# Patient Record
Sex: FEMALE | Race: WHITE | Employment: OTHER | URBAN - METROPOLITAN AREA
[De-identification: names, ages, dates, MRNs, and addresses within clinical notes are randomized per-mention and may not be internally consistent; named-entity substitution may affect disease eponyms.]

---

## 2022-10-25 ENCOUNTER — HOSPITAL ENCOUNTER (OUTPATIENT)
Dept: PREADMISSION TESTING | Age: 72
Discharge: HOME | End: 2022-10-25
Payer: MEDICARE

## 2022-10-25 ENCOUNTER — TRANSCRIBE ORDER (OUTPATIENT)
Dept: REGISTRATION | Age: 72
End: 2022-10-25

## 2022-10-25 DIAGNOSIS — M17.11 OSTEOARTHRITIS OF RIGHT KNEE: ICD-10-CM

## 2022-10-25 DIAGNOSIS — M17.11 OSTEOARTHRITIS OF RIGHT KNEE: Primary | ICD-10-CM

## 2022-10-25 LAB
ALBUMIN SERPL-MCNC: 3.8 G/DL (ref 3.4–5)
ALBUMIN/GLOB SERPL: 1 {RATIO} (ref 0.8–1.7)
ALP SERPL-CCNC: 73 U/L (ref 45–117)
ALT SERPL-CCNC: 45 U/L (ref 13–56)
ANION GAP SERPL CALC-SCNC: 5 MMOL/L (ref 3–18)
APPEARANCE UR: CLEAR
APTT PPP: 25.5 SEC (ref 23–36.4)
AST SERPL-CCNC: 23 U/L (ref 10–38)
BACTERIA URNS QL MICRO: ABNORMAL /HPF
BASOPHILS # BLD: 0 K/UL (ref 0–0.1)
BASOPHILS NFR BLD: 1 % (ref 0–2)
BILIRUB SERPL-MCNC: 0.4 MG/DL (ref 0.2–1)
BILIRUB UR QL: NEGATIVE
BUN SERPL-MCNC: 23 MG/DL (ref 7–18)
BUN/CREAT SERPL: 27 (ref 12–20)
CALCIUM SERPL-MCNC: 10.1 MG/DL (ref 8.5–10.1)
CHLORIDE SERPL-SCNC: 104 MMOL/L (ref 100–111)
CO2 SERPL-SCNC: 30 MMOL/L (ref 21–32)
COLOR UR: YELLOW
CREAT SERPL-MCNC: 0.86 MG/DL (ref 0.6–1.3)
DIFFERENTIAL METHOD BLD: ABNORMAL
EOSINOPHIL # BLD: 0 K/UL (ref 0–0.4)
EOSINOPHIL NFR BLD: 1 % (ref 0–5)
EPITH CASTS URNS QL MICRO: ABNORMAL /LPF (ref 0–5)
ERYTHROCYTE [DISTWIDTH] IN BLOOD BY AUTOMATED COUNT: 14 % (ref 11.6–14.5)
ERYTHROCYTE [SEDIMENTATION RATE] IN BLOOD: 21 MM/HR (ref 0–30)
EST. AVERAGE GLUCOSE BLD GHB EST-MCNC: 120 MG/DL
GLOBULIN SER CALC-MCNC: 3.8 G/DL (ref 2–4)
GLUCOSE SERPL-MCNC: 71 MG/DL (ref 74–99)
GLUCOSE UR STRIP.AUTO-MCNC: NEGATIVE MG/DL
HBA1C MFR BLD: 5.8 % (ref 4.2–5.6)
HCT VFR BLD AUTO: 38.9 % (ref 35–45)
HGB BLD-MCNC: 12.7 G/DL (ref 12–16)
HGB UR QL STRIP: NEGATIVE
IMM GRANULOCYTES # BLD AUTO: 0 K/UL (ref 0–0.04)
IMM GRANULOCYTES NFR BLD AUTO: 0 % (ref 0–0.5)
INR PPP: 1 (ref 0.8–1.2)
KETONES UR QL STRIP.AUTO: NEGATIVE MG/DL
LEUKOCYTE ESTERASE UR QL STRIP.AUTO: ABNORMAL
LYMPHOCYTES # BLD: 1.9 K/UL (ref 0.9–3.6)
LYMPHOCYTES NFR BLD: 47 % (ref 21–52)
MCH RBC QN AUTO: 31.6 PG (ref 24–34)
MCHC RBC AUTO-ENTMCNC: 32.6 G/DL (ref 31–37)
MCV RBC AUTO: 96.8 FL (ref 78–100)
MONOCYTES # BLD: 0.6 K/UL (ref 0.05–1.2)
MONOCYTES NFR BLD: 14 % (ref 3–10)
NEUTS SEG # BLD: 1.6 K/UL (ref 1.8–8)
NEUTS SEG NFR BLD: 38 % (ref 40–73)
NITRITE UR QL STRIP.AUTO: NEGATIVE
NRBC # BLD: 0 K/UL (ref 0–0.01)
NRBC BLD-RTO: 0 PER 100 WBC
PH UR STRIP: 5.5 [PH] (ref 5–8)
PLATELET # BLD AUTO: 259 K/UL (ref 135–420)
PMV BLD AUTO: 11 FL (ref 9.2–11.8)
POTASSIUM SERPL-SCNC: 4.3 MMOL/L (ref 3.5–5.5)
PROT SERPL-MCNC: 7.6 G/DL (ref 6.4–8.2)
PROT UR STRIP-MCNC: NEGATIVE MG/DL
PROTHROMBIN TIME: 13.5 SEC (ref 11.5–15.2)
RBC # BLD AUTO: 4.02 M/UL (ref 4.2–5.3)
RBC #/AREA URNS HPF: NEGATIVE /HPF (ref 0–5)
SODIUM SERPL-SCNC: 139 MMOL/L (ref 136–145)
SP GR UR REFRACTOMETRY: 1.01 (ref 1–1.03)
UROBILINOGEN UR QL STRIP.AUTO: 0.2 EU/DL (ref 0.2–1)
WBC # BLD AUTO: 4.1 K/UL (ref 4.6–13.2)
WBC URNS QL MICRO: ABNORMAL /HPF (ref 0–5)

## 2022-10-25 PROCEDURE — 81001 URINALYSIS AUTO W/SCOPE: CPT

## 2022-10-25 PROCEDURE — 80053 COMPREHEN METABOLIC PANEL: CPT

## 2022-10-25 PROCEDURE — 85025 COMPLETE CBC W/AUTO DIFF WBC: CPT

## 2022-10-25 PROCEDURE — 85652 RBC SED RATE AUTOMATED: CPT

## 2022-10-25 PROCEDURE — 83036 HEMOGLOBIN GLYCOSYLATED A1C: CPT

## 2022-10-25 PROCEDURE — 93005 ELECTROCARDIOGRAM TRACING: CPT

## 2022-10-25 PROCEDURE — 85730 THROMBOPLASTIN TIME PARTIAL: CPT

## 2022-10-25 PROCEDURE — 85610 PROTHROMBIN TIME: CPT

## 2022-10-25 PROCEDURE — 36415 COLL VENOUS BLD VENIPUNCTURE: CPT

## 2022-10-26 LAB
ATRIAL RATE: 67 BPM
CALCULATED P AXIS, ECG09: 35 DEGREES
CALCULATED R AXIS, ECG10: 63 DEGREES
CALCULATED T AXIS, ECG11: 69 DEGREES
DIAGNOSIS, 93000: NORMAL
P-R INTERVAL, ECG05: 130 MS
Q-T INTERVAL, ECG07: 390 MS
QRS DURATION, ECG06: 76 MS
QTC CALCULATION (BEZET), ECG08: 412 MS
VENTRICULAR RATE, ECG03: 67 BPM

## 2022-10-27 LAB
BACTERIA SPEC CULT: NORMAL
BACTERIA SPEC CULT: NORMAL
SERVICE CMNT-IMP: NORMAL

## 2022-11-01 ENCOUNTER — HOSPITAL ENCOUNTER (OUTPATIENT)
Dept: PREADMISSION TESTING | Age: 72
Discharge: HOME | End: 2022-11-01

## 2022-11-01 VITALS — BODY MASS INDEX: 34.8 KG/M2 | HEIGHT: 69 IN | WEIGHT: 235 LBS

## 2022-11-01 RX ORDER — BUPROPION HYDROCHLORIDE 150 MG/1
150 TABLET ORAL DAILY
COMMUNITY

## 2022-11-01 RX ORDER — MELATONIN
1000 DAILY
COMMUNITY

## 2022-11-01 RX ORDER — METFORMIN HYDROCHLORIDE 500 MG/1
500 TABLET, EXTENDED RELEASE ORAL EVERY MORNING
COMMUNITY
Start: 2022-07-13

## 2022-11-01 RX ORDER — RAMIPRIL 5 MG/1
CAPSULE ORAL
COMMUNITY
End: 2022-11-01

## 2022-11-01 RX ORDER — LOSARTAN POTASSIUM 100 MG/1
100 TABLET ORAL DAILY
COMMUNITY
End: 2022-11-01

## 2022-11-01 RX ORDER — BUPROPION HYDROCHLORIDE 300 MG/1
TABLET ORAL
COMMUNITY
End: 2022-11-01

## 2022-11-01 RX ORDER — THERA TABS 400 MCG
1 TAB ORAL DAILY
COMMUNITY

## 2022-11-01 RX ORDER — OLMESARTAN MEDOXOMIL 40 MG/1
40 TABLET ORAL DAILY
COMMUNITY

## 2022-11-01 RX ORDER — HYDROCHLOROTHIAZIDE 25 MG/1
25 TABLET ORAL DAILY
COMMUNITY
Start: 2022-09-20

## 2022-11-01 RX ORDER — OXYCODONE HYDROCHLORIDE 5 MG/1
5 TABLET ORAL
COMMUNITY
Start: 2022-09-01 | End: 2022-11-01

## 2022-11-01 RX ORDER — CEFAZOLIN SODIUM/WATER 2 G/20 ML
2 SYRINGE (ML) INTRAVENOUS ONCE
Status: CANCELLED | OUTPATIENT
Start: 2022-11-01 | End: 2022-11-01

## 2022-11-01 RX ORDER — SODIUM CHLORIDE, SODIUM LACTATE, POTASSIUM CHLORIDE, CALCIUM CHLORIDE 600; 310; 30; 20 MG/100ML; MG/100ML; MG/100ML; MG/100ML
125 INJECTION, SOLUTION INTRAVENOUS CONTINUOUS
Status: CANCELLED | OUTPATIENT
Start: 2022-11-01

## 2022-11-01 NOTE — PERIOP NOTES
PAT - SURGICAL PRE-ADMISSION INSTRUCTIONS    NAME:  Kate Pratt DATE:  11/1/2022    SURGERY DATE:  11/21/2022                                  SURGERY ARRIVAL TIME:   TBA    Do NOT eat or drink anything, including candy or gum, after MIDNIGHT on 11/21/2022 , unless you have specific instructions from your Surgeon or Anesthesia Provider to do so. No smoking 24 hours before surgery. No alcohol 24 hours prior to the day of surgery. No recreational drugs for one week prior to the day of surgery. Leave all valuables, including money/purse, at home. Remove all jewelry, nail polish, makeup (including mascara); no lotions, powders, deodorant, or perfume/cologne/after shave. Glasses/Contact lenses and Dentures may be worn to the hospital.  They will be removed prior to surgery. Call your doctor if symptoms of a cold or illness develop within 24 ours prior to surgery. AN ADULT MUST DRIVE YOU HOME AFTER OUTPATIENT SURGERY. If you are having an OUTPATIENT procedure, please make arrangements for a responsible adult to be with you for 24 hours after your surgery. If you are admitted to the hospital, you will be assigned to a bed after surgery is complete. Normally a family member will not be able to see you until you are in your assigned bed. 12. Visitation Restrictions Explained. Special Instructions:  Covid Test not needed , Quarantine requirements discussed  Patient has Advanced Directive not sure on DNR will bring copy to be scanned into record day of surgery  Take these medications the morning of surgery with a sip of water:  bupropion , Bring any pertinent legal medical records., Bring durable medical equipment (DME) needed:  wa;ler, HOLD oral diabetic medication on the MORNING OF surgery.     Patient Prep:    use CHG solution three nights prior to procedure     These surgical instructions were reviewed with patient during the PAT phone call

## 2022-11-20 PROBLEM — M17.11 OSTEOARTHRITIS OF RIGHT KNEE: Chronic | Status: ACTIVE | Noted: 2022-11-20

## 2022-11-20 PROBLEM — M17.11 OSTEOARTHRITIS OF RIGHT KNEE: Status: ACTIVE | Noted: 2022-11-20

## 2022-11-20 RX ORDER — SODIUM CHLORIDE 9 MG/ML
125 INJECTION, SOLUTION INTRAVENOUS CONTINUOUS
Status: CANCELLED | OUTPATIENT
Start: 2022-11-20 | End: 2022-11-21

## 2022-11-20 RX ORDER — CEFAZOLIN SODIUM/WATER 2 G/20 ML
2 SYRINGE (ML) INTRAVENOUS EVERY 8 HOURS
Status: CANCELLED | OUTPATIENT
Start: 2022-11-20 | End: 2022-11-21

## 2022-11-20 RX ORDER — OXYCODONE HYDROCHLORIDE 5 MG/1
10 TABLET ORAL
Status: CANCELLED | OUTPATIENT
Start: 2022-11-20

## 2022-11-20 RX ORDER — SODIUM CHLORIDE 0.9 % (FLUSH) 0.9 %
5-40 SYRINGE (ML) INJECTION EVERY 8 HOURS
Status: CANCELLED | OUTPATIENT
Start: 2022-11-20

## 2022-11-20 RX ORDER — SODIUM CHLORIDE 0.9 % (FLUSH) 0.9 %
5-40 SYRINGE (ML) INJECTION AS NEEDED
Status: CANCELLED | OUTPATIENT
Start: 2022-11-20

## 2022-11-20 RX ORDER — SODIUM CHLORIDE 9 MG/ML
300 INJECTION, SOLUTION INTRAVENOUS CONTINUOUS
Status: CANCELLED | OUTPATIENT
Start: 2022-11-20 | End: 2022-11-20

## 2022-11-20 RX ORDER — DOCUSATE SODIUM 100 MG/1
100 CAPSULE, LIQUID FILLED ORAL DAILY
Status: CANCELLED | OUTPATIENT
Start: 2022-11-21

## 2022-11-20 RX ORDER — LANOLIN ALCOHOL/MO/W.PET/CERES
1 CREAM (GRAM) TOPICAL 3 TIMES DAILY
Status: CANCELLED | OUTPATIENT
Start: 2022-11-20

## 2022-11-20 RX ORDER — ACETAMINOPHEN 325 MG/1
650 TABLET ORAL EVERY 6 HOURS
Status: CANCELLED | OUTPATIENT
Start: 2022-11-20

## 2022-11-20 RX ORDER — DIPHENHYDRAMINE HYDROCHLORIDE 50 MG/ML
12.5 INJECTION, SOLUTION INTRAMUSCULAR; INTRAVENOUS
Status: CANCELLED | OUTPATIENT
Start: 2022-11-20

## 2022-11-20 RX ORDER — NALOXONE HYDROCHLORIDE 0.4 MG/ML
0.4 INJECTION, SOLUTION INTRAMUSCULAR; INTRAVENOUS; SUBCUTANEOUS AS NEEDED
Status: CANCELLED | OUTPATIENT
Start: 2022-11-20

## 2022-11-20 RX ORDER — ASPIRIN 81 MG/1
81 TABLET ORAL 2 TIMES DAILY
Status: CANCELLED | OUTPATIENT
Start: 2022-11-20

## 2022-11-20 NOTE — H&P
[address] [facility] [address] Medicine  History and Physical Exam    Patient: Elizabeth Waldron MRN: [id number]  SSN: [id number]    YOB: 1950  Age: 81 y.o. Sex: female      Subjective:      Chief Complaint: Right knee pain    History of Present Illness:  Patient complains of pain to the right knee and difficulty ambulating, which has progressively worsened over several months. X-rays showed osteoarthritis of the joint. The patient's pain has persisted and progressed despite conservative treatments and therapies. The patient has been previously treated with medications and/or injections. The patient has at this time opted for surgical intervention. 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 in normal range    Diabetes ([facility] [address]     Hypertension     Osteoarthritis of right knee 11/20/2022    Psychiatric disorder      Past Surgical History:   Procedure Laterality Date    HX APPENDECTOMY  2022    HX HEENT      dental implant    HX ORTHOPAEDIC  2004    bilaterl menicus repair    HX OTHER SURGICAL      colonoscopy x 3    [place] ABDOMEN SURGERY PROC UNLISTED  2022    tumor removed from colon     Social History     Occupational History    Not on file   Tobacco Use    Smoking status: Never    Smokeless tobacco: Never   Vaping Use    Vaping Use: Never used   Substance and Sexual Activity    Alcohol use: Yes     Comment: socially    Drug use: Not Currently     Comment: in 70's not currently    Sexual activity: Not on file     Prior to Admission medications    Medication Sig Start Date End Date Taking? Authorizing Provider   buPROPion XL (WELLBUTRIN XL) 150 mg tablet Take 150 mg by mouth daily. Provider, Historical   cholecalciferol (VITAMIN D3) (1000 Units /25 mcg) tablet Take 1,000 Units by mouth daily.     Provider, Historical hydroCHLOROthiazide (HYDRODIURIL) 25 mg tablet Take 25 mg by mouth daily. 9/20/22   Provider, Historical   metFORMIN ER (GLUCOPHAGE XR) 500 mg tablet Take 500 mg by mouth Every morning. 7/13/22   Provider, Historical   therapeutic multivitamin (THERAGRAN) tablet Take 1 Tablet by mouth daily. Provider, Historical   olmesartan (BENICAR) 40 mg tablet Take 40 mg by mouth daily. Provider, Historical   OTHER Take 1 Tablet by mouth daily. Zinc    Provider, Historical       Allergies: Allergies   Allergen Reactions    Ace Inhibitors Cough        Review of Systems:  A comprehensive review of systems was negative except for that written in the History of Present Illness. Objective:       Physical Exam:  HEENT: Normocephalic, atraumatic  Lungs:  Clear to auscultation  Heart:   Regular rate and rhythm  Abdomen: Soft  Extremities:  Pain with range of motion of the right knee. Active ROM limited due to pain. Tenderness generalized. Crepitus present. Antalgic gait. Assessment:      Arthritis of the right knee. Plan:       Proceed with scheduled RIGHT TOTAL KNEE ARTHROPLASTY. The various methods of treatment have been discussed with the patient and family. After consideration of risks, benefits, and other options for treatment, the patient has consented to surgical interventions. Questions were answered and preoperative teaching was done by Dr Cady Hollis.      Signed By: ERICA Feng     November 20, 2022

## 2022-11-21 ENCOUNTER — HOSPITAL ENCOUNTER (OUTPATIENT)
Age: 72
Discharge: HOME HEALTH | End: 2022-11-21
Attending: ORTHOPAEDIC SURGERY | Admitting: ORTHOPAEDIC SURGERY
Payer: MEDICARE

## 2022-11-21 ENCOUNTER — HOME HEALTH ADMISSION (OUTPATIENT)
Dept: HOME HEALTH SERVICES | Facility: HOME HEALTH | Age: 72
End: 2022-11-21
Payer: MEDICARE

## 2022-11-21 ENCOUNTER — ANESTHESIA EVENT (OUTPATIENT)
Dept: SURGERY | Age: 72
End: 2022-11-21
Payer: MEDICARE

## 2022-11-21 ENCOUNTER — ANESTHESIA (OUTPATIENT)
Dept: SURGERY | Age: 72
End: 2022-11-21
Payer: MEDICARE

## 2022-11-21 ENCOUNTER — APPOINTMENT (OUTPATIENT)
Dept: GENERAL RADIOLOGY | Age: 72
End: 2022-11-21
Attending: PHYSICIAN ASSISTANT
Payer: MEDICARE

## 2022-11-21 VITALS
RESPIRATION RATE: 14 BRPM | HEART RATE: 74 BPM | SYSTOLIC BLOOD PRESSURE: 140 MMHG | WEIGHT: 231.9 LBS | DIASTOLIC BLOOD PRESSURE: 67 MMHG | OXYGEN SATURATION: 95 % | HEIGHT: 69 IN | TEMPERATURE: 98.4 F | BODY MASS INDEX: 34.35 KG/M2

## 2022-11-21 DIAGNOSIS — M17.11 PRIMARY OSTEOARTHRITIS OF RIGHT KNEE: Primary | Chronic | ICD-10-CM

## 2022-11-21 LAB
ABO + RH BLD: NORMAL
BLOOD GROUP ANTIBODIES SERPL: NORMAL
GLUCOSE BLD STRIP.AUTO-MCNC: 101 MG/DL (ref 70–110)
GLUCOSE BLD STRIP.AUTO-MCNC: 123 MG/DL (ref 70–110)
SPECIMEN EXP DATE BLD: NORMAL

## 2022-11-21 PROCEDURE — 74011250636 HC RX REV CODE- 250/636: Performed by: ORTHOPAEDIC SURGERY

## 2022-11-21 PROCEDURE — 77030027138 HC INCENT SPIROMETER -A: Performed by: ORTHOPAEDIC SURGERY

## 2022-11-21 PROCEDURE — 73560 X-RAY EXAM OF KNEE 1 OR 2: CPT

## 2022-11-21 PROCEDURE — 77030039760: Performed by: ORTHOPAEDIC SURGERY

## 2022-11-21 PROCEDURE — 74011250636 HC RX REV CODE- 250/636: Performed by: PHYSICIAN ASSISTANT

## 2022-11-21 PROCEDURE — 74011000250 HC RX REV CODE- 250

## 2022-11-21 PROCEDURE — 74011250636 HC RX REV CODE- 250/636

## 2022-11-21 PROCEDURE — 74011250636 HC RX REV CODE- 250/636: Performed by: ANESTHESIOLOGY

## 2022-11-21 PROCEDURE — 77030013708 HC HNDPC SUC IRR PULS STRY –B: Performed by: ORTHOPAEDIC SURGERY

## 2022-11-21 PROCEDURE — 74011000250 HC RX REV CODE- 250: Performed by: ANESTHESIOLOGY

## 2022-11-21 PROCEDURE — 97161 PT EVAL LOW COMPLEX 20 MIN: CPT

## 2022-11-21 PROCEDURE — 77030016060 HC NDL NRV BLK TELE -A: Performed by: ANESTHESIOLOGY

## 2022-11-21 PROCEDURE — 64447 NJX AA&/STRD FEMORAL NRV IMG: CPT | Performed by: ANESTHESIOLOGY

## 2022-11-21 PROCEDURE — 76942 ECHO GUIDE FOR BIOPSY: CPT | Performed by: ORTHOPAEDIC SURGERY

## 2022-11-21 PROCEDURE — 74011250637 HC RX REV CODE- 250/637: Performed by: ORTHOPAEDIC SURGERY

## 2022-11-21 PROCEDURE — 82962 GLUCOSE BLOOD TEST: CPT

## 2022-11-21 PROCEDURE — 74011000250 HC RX REV CODE- 250: Performed by: ORTHOPAEDIC SURGERY

## 2022-11-21 PROCEDURE — 77030002934 HC SUT MCRYL J&J -B: Performed by: ORTHOPAEDIC SURGERY

## 2022-11-21 PROCEDURE — 36415 COLL VENOUS BLD VENIPUNCTURE: CPT

## 2022-11-21 PROCEDURE — 74011250637 HC RX REV CODE- 250/637: Performed by: PHYSICIAN ASSISTANT

## 2022-11-21 PROCEDURE — 76210000006 HC OR PH I REC 0.5 TO 1 HR: Performed by: ORTHOPAEDIC SURGERY

## 2022-11-21 PROCEDURE — 76060000033 HC ANESTHESIA 1 TO 1.5 HR: Performed by: ORTHOPAEDIC SURGERY

## 2022-11-21 PROCEDURE — 76010000149 HC OR TIME 1 TO 1.5 HR: Performed by: ORTHOPAEDIC SURGERY

## 2022-11-21 PROCEDURE — 2709999900 HC NON-CHARGEABLE SUPPLY: Performed by: ORTHOPAEDIC SURGERY

## 2022-11-21 PROCEDURE — 74011000258 HC RX REV CODE- 258: Performed by: ORTHOPAEDIC SURGERY

## 2022-11-21 PROCEDURE — 77030031139 HC SUT VCRL2 J&J -A: Performed by: ORTHOPAEDIC SURGERY

## 2022-11-21 PROCEDURE — 97535 SELF CARE MNGMENT TRAINING: CPT

## 2022-11-21 PROCEDURE — 77030037713 HC CLOSR DEV INCIS ZIP STRY -B: Performed by: ORTHOPAEDIC SURGERY

## 2022-11-21 PROCEDURE — C1776 JOINT DEVICE (IMPLANTABLE): HCPCS | Performed by: ORTHOPAEDIC SURGERY

## 2022-11-21 PROCEDURE — 86900 BLOOD TYPING SEROLOGIC ABO: CPT

## 2022-11-21 PROCEDURE — 77030014144 HC TY SPN ANES BBMI -B: Performed by: ANESTHESIOLOGY

## 2022-11-21 PROCEDURE — 77030012893: Performed by: ORTHOPAEDIC SURGERY

## 2022-11-21 PROCEDURE — 77030034694 HC SCPL CANADY PLSM DISP USMD -E: Performed by: ORTHOPAEDIC SURGERY

## 2022-11-21 PROCEDURE — 76210000022 HC REC RM PH II 1.5 TO 2 HR: Performed by: ORTHOPAEDIC SURGERY

## 2022-11-21 PROCEDURE — 97165 OT EVAL LOW COMPLEX 30 MIN: CPT

## 2022-11-21 PROCEDURE — 74011250637 HC RX REV CODE- 250/637: Performed by: ANESTHESIOLOGY

## 2022-11-21 PROCEDURE — 77030033263 HC DRSG MEPILEX 16-48IN BORD MOLN -B: Performed by: ORTHOPAEDIC SURGERY

## 2022-11-21 PROCEDURE — 77030011628: Performed by: ORTHOPAEDIC SURGERY

## 2022-11-21 PROCEDURE — C1713 ANCHOR/SCREW BN/BN,TIS/BN: HCPCS | Performed by: ORTHOPAEDIC SURGERY

## 2022-11-21 PROCEDURE — 77030020782 HC GWN BAIR PAWS FLX 3M -B: Performed by: ORTHOPAEDIC SURGERY

## 2022-11-21 PROCEDURE — 77030038010: Performed by: ORTHOPAEDIC SURGERY

## 2022-11-21 PROCEDURE — 97116 GAIT TRAINING THERAPY: CPT

## 2022-11-21 PROCEDURE — 77030003666 HC NDL SPINAL BD -A: Performed by: ORTHOPAEDIC SURGERY

## 2022-11-21 DEVICE — CEMENT BNE 40GM FULL DOSE PMMA W/O ANTIBIO HI VISC N RADPQ: Type: IMPLANTABLE DEVICE | Site: KNEE | Status: FUNCTIONAL

## 2022-11-21 DEVICE — SIZE 4 TIBIAL TRAY NONPOROUS
Type: IMPLANTABLE DEVICE | Site: KNEE | Status: FUNCTIONAL
Brand: BALANCED KNEE SYSTEM

## 2022-11-21 DEVICE — KNEE K1 TOT HEMI STD CEM IMPL CAPPED K1 OD: Type: IMPLANTABLE DEVICE | Site: KNEE | Status: FUNCTIONAL

## 2022-11-21 DEVICE — RT SIZE 4 FEMORAL CR NONPOROUS
Type: IMPLANTABLE DEVICE | Site: KNEE | Status: FUNCTIONAL
Brand: BKS TRIMAX

## 2022-11-21 DEVICE — SIZE 4 8MM TIBIAL INSERT CR
Type: IMPLANTABLE DEVICE | Site: KNEE | Status: FUNCTIONAL
Brand: BKS E-VITALIZE

## 2022-11-21 RX ORDER — ROPIVACAINE HYDROCHLORIDE 5 MG/ML
INJECTION, SOLUTION EPIDURAL; INFILTRATION; PERINEURAL AS NEEDED
Status: DISCONTINUED | OUTPATIENT
Start: 2022-11-21 | End: 2022-11-21 | Stop reason: HOSPADM

## 2022-11-21 RX ORDER — PREGABALIN 25 MG/1
25 CAPSULE ORAL
Status: COMPLETED | OUTPATIENT
Start: 2022-11-21 | End: 2022-11-21

## 2022-11-21 RX ORDER — PANTOPRAZOLE SODIUM 40 MG/1
40 TABLET, DELAYED RELEASE ORAL DAILY
Status: DISCONTINUED | OUTPATIENT
Start: 2022-11-21 | End: 2022-11-21 | Stop reason: HOSPADM

## 2022-11-21 RX ORDER — PROPOFOL 10 MG/ML
INJECTION, EMULSION INTRAVENOUS
Status: DISCONTINUED | OUTPATIENT
Start: 2022-11-21 | End: 2022-11-21 | Stop reason: HOSPADM

## 2022-11-21 RX ORDER — CEFADROXIL 500 MG/1
500 CAPSULE ORAL 2 TIMES DAILY
Qty: 10 CAPSULE | Refills: 0 | Status: SHIPPED | OUTPATIENT
Start: 2022-11-21 | End: 2022-11-26

## 2022-11-21 RX ORDER — PANTOPRAZOLE SODIUM 40 MG/1
40 TABLET, DELAYED RELEASE ORAL DAILY
Status: DISCONTINUED | OUTPATIENT
Start: 2022-11-21 | End: 2022-11-21

## 2022-11-21 RX ORDER — ONDANSETRON 2 MG/ML
4 INJECTION INTRAMUSCULAR; INTRAVENOUS ONCE
Status: DISCONTINUED | OUTPATIENT
Start: 2022-11-21 | End: 2022-11-21 | Stop reason: HOSPADM

## 2022-11-21 RX ORDER — DEXAMETHASONE SODIUM PHOSPHATE 4 MG/ML
4 INJECTION, SOLUTION INTRA-ARTICULAR; INTRALESIONAL; INTRAMUSCULAR; INTRAVENOUS; SOFT TISSUE ONCE
Status: COMPLETED | OUTPATIENT
Start: 2022-11-21 | End: 2022-11-21

## 2022-11-21 RX ORDER — SODIUM CHLORIDE, SODIUM LACTATE, POTASSIUM CHLORIDE, CALCIUM CHLORIDE 600; 310; 30; 20 MG/100ML; MG/100ML; MG/100ML; MG/100ML
125 INJECTION, SOLUTION INTRAVENOUS CONTINUOUS
Status: DISCONTINUED | OUTPATIENT
Start: 2022-11-21 | End: 2022-11-21 | Stop reason: HOSPADM

## 2022-11-21 RX ORDER — FENTANYL CITRATE 50 UG/ML
INJECTION, SOLUTION INTRAMUSCULAR; INTRAVENOUS AS NEEDED
Status: DISCONTINUED | OUTPATIENT
Start: 2022-11-21 | End: 2022-11-21 | Stop reason: HOSPADM

## 2022-11-21 RX ORDER — FENTANYL CITRATE 50 UG/ML
50 INJECTION, SOLUTION INTRAMUSCULAR; INTRAVENOUS
Status: DISCONTINUED | OUTPATIENT
Start: 2022-11-21 | End: 2022-11-21 | Stop reason: HOSPADM

## 2022-11-21 RX ORDER — OXYCODONE AND ACETAMINOPHEN 5; 325 MG/1; MG/1
1 TABLET ORAL
Status: DISCONTINUED | OUTPATIENT
Start: 2022-11-21 | End: 2022-11-21 | Stop reason: HOSPADM

## 2022-11-21 RX ORDER — DEXMEDETOMIDINE HYDROCHLORIDE 100 UG/ML
INJECTION, SOLUTION INTRAVENOUS AS NEEDED
Status: DISCONTINUED | OUTPATIENT
Start: 2022-11-21 | End: 2022-11-21 | Stop reason: HOSPADM

## 2022-11-21 RX ORDER — ONDANSETRON 2 MG/ML
4 INJECTION INTRAMUSCULAR; INTRAVENOUS
Status: DISCONTINUED | OUTPATIENT
Start: 2022-11-21 | End: 2022-11-21 | Stop reason: HOSPADM

## 2022-11-21 RX ORDER — PROPOFOL 10 MG/ML
INJECTION, EMULSION INTRAVENOUS AS NEEDED
Status: DISCONTINUED | OUTPATIENT
Start: 2022-11-21 | End: 2022-11-21 | Stop reason: HOSPADM

## 2022-11-21 RX ORDER — ACETAMINOPHEN 500 MG
1000 TABLET ORAL ONCE
Status: COMPLETED | OUTPATIENT
Start: 2022-11-21 | End: 2022-11-21

## 2022-11-21 RX ORDER — DEXAMETHASONE SODIUM PHOSPHATE 4 MG/ML
8 INJECTION, SOLUTION INTRA-ARTICULAR; INTRALESIONAL; INTRAMUSCULAR; INTRAVENOUS; SOFT TISSUE ONCE
Status: COMPLETED | OUTPATIENT
Start: 2022-11-21 | End: 2022-11-21

## 2022-11-21 RX ORDER — HYDROMORPHONE HYDROCHLORIDE 1 MG/ML
0.5 INJECTION, SOLUTION INTRAMUSCULAR; INTRAVENOUS; SUBCUTANEOUS
Status: DISCONTINUED | OUTPATIENT
Start: 2022-11-21 | End: 2022-11-21 | Stop reason: HOSPADM

## 2022-11-21 RX ORDER — TRANEXAMIC ACID 650 MG/1
1950 TABLET ORAL ONCE
Status: COMPLETED | OUTPATIENT
Start: 2022-11-21 | End: 2022-11-21

## 2022-11-21 RX ORDER — ASPIRIN 81 MG/1
81 TABLET ORAL 2 TIMES DAILY
Qty: 42 TABLET | Refills: 0 | Status: SHIPPED | OUTPATIENT
Start: 2022-11-21 | End: 2022-12-12

## 2022-11-21 RX ORDER — CEFAZOLIN SODIUM/WATER 2 G/20 ML
2 SYRINGE (ML) INTRAVENOUS ONCE
Status: COMPLETED | OUTPATIENT
Start: 2022-11-21 | End: 2022-11-21

## 2022-11-21 RX ORDER — DEXAMETHASONE SODIUM PHOSPHATE 100 MG/10ML
INJECTION INTRAMUSCULAR; INTRAVENOUS AS NEEDED
Status: DISCONTINUED | OUTPATIENT
Start: 2022-11-21 | End: 2022-11-21 | Stop reason: HOSPADM

## 2022-11-21 RX ORDER — OXYCODONE HYDROCHLORIDE 5 MG/1
5 TABLET ORAL
Status: DISCONTINUED | OUTPATIENT
Start: 2022-11-21 | End: 2022-11-21 | Stop reason: HOSPADM

## 2022-11-21 RX ORDER — SODIUM CHLORIDE, SODIUM LACTATE, POTASSIUM CHLORIDE, CALCIUM CHLORIDE 600; 310; 30; 20 MG/100ML; MG/100ML; MG/100ML; MG/100ML
100 INJECTION, SOLUTION INTRAVENOUS CONTINUOUS
Status: DISCONTINUED | OUTPATIENT
Start: 2022-11-21 | End: 2022-11-21 | Stop reason: HOSPADM

## 2022-11-21 RX ORDER — OXYCODONE HYDROCHLORIDE 5 MG/1
5 TABLET ORAL
Qty: 42 TABLET | Refills: 0 | Status: SHIPPED | OUTPATIENT
Start: 2022-11-21 | End: 2022-11-28

## 2022-11-21 RX ORDER — NALOXONE HYDROCHLORIDE 0.4 MG/ML
0.4 INJECTION, SOLUTION INTRAMUSCULAR; INTRAVENOUS; SUBCUTANEOUS AS NEEDED
Status: DISCONTINUED | OUTPATIENT
Start: 2022-11-21 | End: 2022-11-21 | Stop reason: HOSPADM

## 2022-11-21 RX ORDER — CELECOXIB 100 MG/1
400 CAPSULE ORAL
Status: COMPLETED | OUTPATIENT
Start: 2022-11-21 | End: 2022-11-21

## 2022-11-21 RX ORDER — FLUMAZENIL 0.1 MG/ML
0.2 INJECTION INTRAVENOUS
Status: DISCONTINUED | OUTPATIENT
Start: 2022-11-21 | End: 2022-11-21 | Stop reason: HOSPADM

## 2022-11-21 RX ORDER — LIDOCAINE HYDROCHLORIDE 20 MG/ML
INJECTION, SOLUTION EPIDURAL; INFILTRATION; INTRACAUDAL; PERINEURAL AS NEEDED
Status: DISCONTINUED | OUTPATIENT
Start: 2022-11-21 | End: 2022-11-21 | Stop reason: HOSPADM

## 2022-11-21 RX ORDER — DEXAMETHASONE SODIUM PHOSPHATE 4 MG/ML
8 INJECTION, SOLUTION INTRA-ARTICULAR; INTRALESIONAL; INTRAMUSCULAR; INTRAVENOUS; SOFT TISSUE ONCE
Status: DISCONTINUED | OUTPATIENT
Start: 2022-11-21 | End: 2022-11-21

## 2022-11-21 RX ADMIN — TRANEXAMIC ACID 1950 MG: 650 TABLET ORAL at 08:57

## 2022-11-21 RX ADMIN — DEXAMETHASONE SODIUM PHOSPHATE 4 MG: 10 INJECTION INTRAMUSCULAR; INTRAVENOUS at 10:31

## 2022-11-21 RX ADMIN — SODIUM CHLORIDE, SODIUM LACTATE, POTASSIUM CHLORIDE, AND CALCIUM CHLORIDE 1000 ML: 600; 310; 30; 20 INJECTION, SOLUTION INTRAVENOUS at 08:53

## 2022-11-21 RX ADMIN — FENTANYL CITRATE 100 MCG: 50 INJECTION, SOLUTION INTRAMUSCULAR; INTRAVENOUS at 10:28

## 2022-11-21 RX ADMIN — DEXAMETHASONE SODIUM PHOSPHATE 4 MG: 4 INJECTION, SOLUTION INTRAMUSCULAR; INTRAVENOUS at 08:57

## 2022-11-21 RX ADMIN — PROPOFOL 25 MG: 10 INJECTION, EMULSION INTRAVENOUS at 11:47

## 2022-11-21 RX ADMIN — PREGABALIN 25 MG: 25 CAPSULE ORAL at 08:57

## 2022-11-21 RX ADMIN — CELECOXIB 400 MG: 100 CAPSULE ORAL at 08:57

## 2022-11-21 RX ADMIN — LIDOCAINE HYDROCHLORIDE 50 MG: 20 INJECTION, SOLUTION INTRAVENOUS at 11:47

## 2022-11-21 RX ADMIN — ROPIVACAINE HYDROCHLORIDE 25 ML: 5 INJECTION, SOLUTION EPIDURAL; INFILTRATION; PERINEURAL at 10:31

## 2022-11-21 RX ADMIN — PROPOFOL 150 MCG/KG/MIN: 10 INJECTION, EMULSION INTRAVENOUS at 11:50

## 2022-11-21 RX ADMIN — DEXAMETHASONE SODIUM PHOSPHATE 8 MG: 4 INJECTION, SOLUTION INTRAMUSCULAR; INTRAVENOUS at 14:23

## 2022-11-21 RX ADMIN — DEXMEDETOMIDINE HYDROCHLORIDE 20 MCG: 100 INJECTION, SOLUTION INTRAVENOUS at 10:31

## 2022-11-21 RX ADMIN — SODIUM CHLORIDE, SODIUM LACTATE, POTASSIUM CHLORIDE, AND CALCIUM CHLORIDE 125 ML/HR: 600; 310; 30; 20 INJECTION, SOLUTION INTRAVENOUS at 08:53

## 2022-11-21 RX ADMIN — ACETAMINOPHEN 1000 MG: 500 TABLET ORAL at 08:57

## 2022-11-21 RX ADMIN — PANTOPRAZOLE SODIUM 40 MG: 40 TABLET, DELAYED RELEASE ORAL at 08:57

## 2022-11-21 RX ADMIN — Medication 2 G: at 11:32

## 2022-11-21 RX ADMIN — MEPIVACAINE HYDROCHLORIDE 45 MG: 15 INJECTION, SOLUTION EPIDURAL; INFILTRATION at 11:44

## 2022-11-21 RX ADMIN — LIDOCAINE HYDROCHLORIDE 50 MG: 20 INJECTION, SOLUTION INTRAVENOUS at 11:49

## 2022-11-21 RX ADMIN — PROPOFOL 25 MG: 10 INJECTION, EMULSION INTRAVENOUS at 11:49

## 2022-11-21 NOTE — PERIOP NOTES
TRANSFER - IN REPORT:    Verbal report received from [facility], OR nurse(name) on Audelia Leaver  being received from OR (unit) for routine progression of care      Report consisted of patients Situation, Background, Assessment and   Recommendations(SBAR). Information from the following report(s) OR Summary, Procedure Summary, Intake/Output, and MAR was reviewed with the receiving nurse. Opportunity for questions and clarification was provided. Assessment completed upon patients arrival to unit and care assumed.

## 2022-11-21 NOTE — PROGRESS NOTES
Problem: Mobility Impaired (Adult and Pediatric)  Goal: *Acute Goals and Plan of Care (Insert Text)  Description: PT goals to be met in 1 day:  Pt will be able to perform supine<>sit SBA for transfers at home. Pt will be able to perform sit<>stand SBA for increased ability to transfer at home safely. Pt will be able to participate in gt training >100' w/ RW, WBAT, GB and CGA/SBA for improved ability in home upon d/c. Pt will be able to perform stair training step to pattern, B/U rail and CGA to obtain safe entry into home upon d/c. Pt will be educated regarding HEP per MD protocol for optimal AROM/strength outcomes. Note: [x]  Patient has met MD mobilization critieria for d/c home   [x]  Recommend HH with 24 hour adult care   []  Benefit from additional acute PT session to address:      PHYSICAL THERAPY EVALUATION    Patient: Elvin Welsh (10 y.o. female)  Date: 11/21/2022  Primary Diagnosis: Osteoarthritis of right knee [M17.11]  Procedure(s) (LRB):  RIGHT TOTAL KNEE REPLACEMENT (SUBVASTUS) (Right) Day of Surgery   Precautions:   Fall, WBAT  PLOF: Independent    ASSESSMENT :  Based on the objective data described below, decreased mobility in regards to bed mobility, transfers, gt quality and tolerance, balance, stair negotiation and safety due to R TKA surgery. Decreased AROM of R knee, dec strength of R knee, pain in R knee, dec sensation of R knee also impacting pt functional mobility. Pt rating pain on numerical pain scale pre/post and during session 0/10. Pt and caregiver ed regarding mobility safety, WB, HEP, ice application/use, elevation, environmental safety and home safe techniques. Pt supine on stretcher upon arrival.  Pt able to perform supine>sit SBA and sit<>stand w/ CGA/SBA. Safety vc required throughout session to reinforce safety. Pt able to participate in gt training using RW, GB, WBAT and CGA w/ antalgic gt pattern.   Pt was able to participate in stair training using step to pattern, B UE support and CGA. Answered questions by pt and caregiver in regards to PT and mobility. Pt left sitting in recliner w/ all needs within reach and ice pack to R knee. Nurse aware of session and outcomes. Recommend HHPT with responsible adult care at least 24 hours upon hospital d/c. Patient will benefit from skilled intervention to address the above impairments. Patient's rehabilitation potential is considered to be Good  Factors which may influence rehabilitation potential include:   []         None noted  []         Mental ability/status  []         Medical condition  []         Home/family situation and support systems  []         Safety awareness  [x]         Pain tolerance/management  []         Other:      PLAN :  Recommendations and Planned Interventions:   [x]           Bed Mobility Training             []    Neuromuscular Re-Education  [x]           Transfer Training                   []    Orthotic/Prosthetic Training  [x]           Gait Training                          [x]    Modalities  [x]           Therapeutic Exercises           [x]    Edema Management/Control  [x]           Therapeutic Activities            [x]    Family Training/Education  [x]           Patient Education  []           Other (comment):    Frequency/Duration: Patient will be followed by physical therapy 1-2 times per day/4-7 days per week to address goals. Discharge Recommendations: Home Health  Further Equipment Recommendations for Discharge: N/A    AMPA: 18/24    This AMPAC score should be considered in conjunction with interdisciplinary team recommendations to determine the most appropriate discharge setting. Patient's social support, diagnosis, medical stability, and prior level of function should also be taken into consideration. SUBJECTIVE:   Patient stated I am feeling nothing right now. 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 in normal range    Diabetes ([facility]     Hypertension     Osteoarthritis of right knee 11/20/2022    Psychiatric disorder      Past Surgical History:   Procedure Laterality Date    HX APPENDECTOMY  2022    HX HEENT      dental implant    HX ORTHOPAEDIC  2004    bilaterl menicus repair    HX OTHER SURGICAL      colonoscopy x 3    [place] ABDOMEN SURGERY PROC UNLISTED  2022    tumor removed from colon     Barriers to Learning/Limitations: yes;  physical and other anesthesia  Compensate with: Visual Cues, Verbal Cues, Tactile Cues, and Kinesthetic Cues  Home Situation:  Home Situation  Home Environment: Private residence  # Steps to Enter: 2  One/Two Story Residence: Two story  # of Interior Steps: 14  Interior Rails: Right  Lift Chair Available: No  Living Alone: No  Support Systems: Spouse/Significant Other  Patient Expects to be Discharged to[de-identified] Home with home health  Current DME Used/Available at Home: Walker, rolling, Raised toilet seat  Tub or Shower Type: Shower  Critical Behavior:  Neurologic State: Alert  Orientation Level: Oriented to person;Oriented to place;Oriented to situation  Cognition: Follows commands  Safety/Judgement: Awareness of environment  Psychosocial  Patient Behaviors: Calm; Cooperative  Family  Behaviors: Supportive;Calm  Skin Condition/Temp: Warm;Dry  Family  Behaviors: Supportive;Calm  Skin Integrity: Incision (comment)  Skin Integumentary  Skin Color: Appropriate for ethnicity  Skin Condition/Temp: Warm;Dry  Skin Integrity: Incision (comment)  Turgor: Non-tenting  Hair Growth: Present  Varicosities: Absent  Nails: Within Defined Limits  Strength:    Strength: Generally decreased, functional  Tone & Sensation:   Tone: Normal  Sensation: Impaired (R knee)  Range Of Motion:  AROM: Generally decreased, functional  PROM: Generally decreased, functional  Posture:  Functional Mobility:  Bed Mobility:  Supine to Sit: Stand-by assistance (vc)  Scooting: Stand-by assistance (vc)  Transfers:  Sit to Stand: Contact guard assistance (vc)  Stand to Sit: Contact guard assistance;Stand-by assistance (vc)  Balance:   Sitting: Intact  Standing: Intact; With support  Wheelchair Mobility:  Ambulation/Gait Training:  Distance (ft): 170 Feet (ft)  Assistive Device: Walker, rolling;Gait belt  Ambulation - Level of Assistance: Contact guard assistance (vc)  Gait Abnormalities: Antalgic;Decreased step clearance; Step to gait  Right Side Weight Bearing: As tolerated  Base of Support: Shift to left  Stance: Right decreased  Speed/Melvina: Slow  Step Length: Left shortened;Right shortened  Swing Pattern: Left asymmetrical;Right asymmetrical  Interventions: Safety awareness training; Tactile cues; Verbal cues; Visual/Demos  Stairs:  Number of Stairs Trained: 5  Stairs - Level of Assistance: Contact guard assistance (vc)  Rail Use: Both     Therapeutic Exercises:   Encouraged HEP  Pain:  Pain level pre-treatment: 0/10   Pain level post-treatment: 0/10   Pain Intervention(s) : Medication (see MAR); Rest, Ice, Repositioning  Response to intervention: Nurse notified, See doc flow    Activity Tolerance:   Fair  Please refer to the flowsheet for vital signs taken during this treatment. After treatment:   [x]         Patient left in no apparent distress sitting up in chair  []         Patient left in no apparent distress in bed  [x]         Call bell left within reach  [x]         Nursing notified  [x]         Caregiver present  []         Bed alarm activated  []         SCDs applied    COMMUNICATION/EDUCATION:   [x]         Role of Physical Therapy in the acute care setting. [x]         Fall prevention education was provided and the patient/caregiver indicated understanding. [x]         Patient/family have participated as able in goal setting and plan of care.   [x]         Patient/family agree to work toward stated goals and plan of care. []         Patient understands intent and goals of therapy, but is neutral about his/her participation. []         Patient is unable to participate in goal setting/plan of care: ongoing with therapy staff.  []         Other: Thank you for this referral.  Leeroy Howard, PT   Time Calculation: 24 mins      Eval Complexity: History: HIGH Complexity :3+ comorbidities / personal factors will impact the outcome/ POC Exam:MEDIUM Complexity : 3 Standardized tests and measures addressing body structure, function, activity limitation and / or participation in recreation  Presentation: LOW Complexity : Stable, uncomplicated  Clinical Decision Making:Low Complexity  Overall Complexity:LOW     [employer] AM-PAC® Basic Mobility Inpatient Short Form (6-Clicks) Version 2    How much HELP from another person does the patient currently need    (If the patient hasn't done an activity recently, how much help from another person do you think he/she would need if he/she tried?)   Total (Total A or Dep)   A Lot  (Mod to Max A)   A Little (Sup or Min A)   None (Mod I to I)   Turning from your back to your side while in a flat bed without using bedrails? [] 1 [] 2 [x] 3 [] 4   2. Moving from lying on your back to sitting on the side of a flat bed without using bedrails? [] 1 [] 2 [x] 3 [] 4   3. Moving to and from a bed to a chair (including a wheelchair)? [] 1 [] 2 [x] 3 [] 4   4. Standing up from a chair using your arms (e.g., wheelchair, or bedside chair)? [] 1 [] 2 [x] 3 [] 4   5. Walking in hospital room? [] 1 [] 2 [x] 3 [] 4   6. Climbing 3-5 steps with a railing?+   [] 1 [] 2 [x] 3 [] 4   +If stair climbing cannot be assessed, skip item #6. Sum responses from items 1-5. Based on an AM-PAC score of 18/24 and their current functional mobility deficits, it is recommended that the patient have 3-5 sessions per week of Physical Therapy at d/c to increase the patient's independence.

## 2022-11-21 NOTE — OP NOTES
[address] [address]  Total Knee Arthroplasty    Patient: Liza Diane MRN: [id number]  SSN: [id number]    YOB: 1950  Age: 81 y.o. Sex: female      Date of Surgery: 11/21/2022   Preoperative Diagnosis: RIGHT KNEE OSTEOARTHRITIS   Postoperative Diagnosis: RIGHT KNEE OSTEOARTHRITIS   Location: LTAC, [facility]  Surgeon: Adrian Moise MD  Assistant: Qi De Jesus PA-C    Anesthesia: Spinal and adductor canal Nerve Block    Procedure: Total Knee Arthroplasty:   The complexity of the total joint surgery requires the use of a first assistant for positioning, retraction and assistance in closure. Tourniquet Time: Tourniquet not used. Estimated Blood Loss: Less than 100cc     Implants:   Implant Name Type Inv. Item Serial No.  Lot No. LRB No. Used Action   CEMENT BNE 40GM FULL DOSE PMMA W/O ANTIBIO HI VISC N RADPQ - [id number]  CEMENT BNE 40GM FULL DOSE PMMA W/O ANTIBIO HI VISC N RADPQ  [employer]WD [id number] Right 2 Implanted   INSERT TIB SZ 4 THK8MM CRUCE RET BKS E VITALIZE - [id number]  INSERT TIB SZ 4 THK8MM CRUCE RET BKS E VITALIZE  [employer] [id number] Right 1 Implanted   COMPONENT FEM SZ 4 R NP CRUCE RET BKS TRIMAX - [id number]  COMPONENT FEM SZ 4 R NP CRUCE RET BKS TRIMAX  [employer] [id number] Right 1 Implanted   TRAY TIB SZ 4 NP A BAL KNEE - [id number]  TRAY TIB SZ 4 NP A BAL KNEE  [employer] [id number] Right 1 Implanted        Specimens: None    Additional Findings: None     Complications: none    Body Mass Index: Body mass index is 34.75 kg/m². Procedure Detail:  Prior to the surgery the patient was administered a femoral nerve block in the preoperative holding area by the anesthesiologist. Liza Diane was brought to the operating room and positioned on the operating table. She was anesthetized with anesthesia. Intravenous antibiotics were administered.  Prior to the incision being made a timeout was called identifying the patient, procedure, operative side, and surgeon. A pneumatic tourniquet was placed about the limb and the right leg was prepped and draped in the usual sterile manner. The tourniquet was not inflated throughout the case. A midline anterior incision made over the knee. The incision was carried down through the subcutaneous tissue to the underlying capsule. A subvastus capsular incision was performed. The medial capsular flap was carefully elevated around to the posterior medial corner protecting the medial collateral ligaments and the fibers. The patella was inspected and there was very little cartilage wear. It was not resurfaced. It was not everted throughout the case. Our attention was first turned to the distal femur and using intermedullary instrumentation, a 5-degree valgus cut was on the distal end of the femur. The distal end of the femur was sized to a size 4 femoral component. Pins were inserted through the sizer and the corresponding 4-in-1 block was slid into place and pinned for stability. Anterior and posterior and chamfer cuts were made to accommodate the femoral component. The medial and lateral menisci were excised as were the anterior cruciate ligaments. Our attention was then turned to the tibia. Using extramedullary instrumentation, a 3-degree cut was made on the proximal end of the tibia. A spacer block was placed to show gaps had been balanced and a size 4  tibial base plate was placed on the tibia and pinned into place. Intramedullary reaming guide was placed on the tibia and the appropriate reamer was used followed by the keel punch to complete the preparation of the tibia. A trial femoral component was then impacted on to the distal end of the femur. Trial reduction was then performed with incremental size trial bearing surfaces.  The orthodevelopment BKS trimax  CR 8mm bearing surface was inserted and allowed for full extension, good medial, lateral stability at 90 degrees of flexion, especially medially. The patella tracked normally using no-touch technique. The trial components were then all removed. The real components were opened on the back. The cut surfaces of the bone were prepared using the PulsaVac lavage. Prior to this, the Aquamantys was used to cauterize any soft tissue bleeding. 40 grams of Depuy HV cement was mixed. The femoral and tibial components  and patellar component was cemented into place. Excess cement was removed from around the edge of bone using plastic curette. Once the cement had hardened, the knee was placed through range of motion and noted to be stable as mentioned above with the trail components. The wound was dry, therefore no drain was used. The operative knee was injected with 20 cc of exparel. The knee was then soaked with a diluted betadine solution for approximately 3 min. This was then thoroughly irrigated. The capsular layer was closed using a #2 stratafix suture with the knee flexed 90 degrees, while subcutaneous layers were closed using 2-0 Vicryl suture. Finally the skin was closed using Prineo, which were applied in occlusive fashion and sterile bandage applied. All sponge and needle counts were correct. She was taken to the recovery room extubated in stable condition. Signed By: Cheryle Ebbing, MD     November 21, 2022            Operative Note    Patient: Maru Gagnon  YOB: 1950  MRN: [id number]    Date of Procedure: 11/21/2022     Pre-Op Diagnosis: RIGHT KNEE OSTEOARTHRITIS    Post-Op Diagnosis: Same as preoperative diagnosis. Procedure(s):  RIGHT TOTAL KNEE REPLACEMENT (SUBVASTUS)    Surgeon(s):  Kelli Altamirano MD    Surgical Assistant: Physician Assistant: ERICA Resendez    Anesthesia: Spinal     Estimated Blood Loss (mL):  less than 167     Complications: None    Specimens: * No specimens in log *     Implants:   Implant Name Type Inv.  Item Serial No.  Lot No. LRB No. Used Action   CEMENT BNE 40GM FULL DOSE PMMA W/O ANTIBIO HI VISC N RADPQ - [id number]  CEMENT BNE 40GM FULL DOSE PMMA W/O ANTIBIO HI VISC N RADPQ  [employer]WD [id number] Right 2 Implanted   INSERT TIB SZ 4 THK8MM CRUCE RET BKS E VITALIZE - [id number]  INSERT TIB SZ 4 THK8MM CRUCE RET BKS E VITALIZE  [employer] [id number] Right 1 Implanted   COMPONENT FEM SZ 4 R NP CRUCE RET BKS TRIMAX - [id number]  COMPONENT FEM SZ 4 R NP CRUCE RET BKS TRIMAX  [employer] [id number] Right 1 Implanted   TRAY TIB SZ 4 NP A BAL KNEE - [id number]  TRAY TIB SZ 4 NP A BAL KNEE  [employer] [id number] Right 1 Implanted       Drains: * No LDAs found *    Findings: djd knee    Detailed Description of Procedure:   See above note    Electronically Signed by Charla Reis MD on 11/21/2022 at 1:23 PM

## 2022-11-21 NOTE — ANESTHESIA POSTPROCEDURE EVALUATION
Post-Anesthesia Evaluation & Assessment    Visit Vitals  /63   Pulse 71   Temp 37.1 °C (98.8 °F)   Resp 14   Ht 5' 8.5\" (1.74 m)   Wt 105.2 kg (231 lb 14.4 oz)   SpO2 100%   BMI 34.75 kg/m²       Nausea/Vomiting: Controlled. Post-operative hydration adequate. Pain Scale 1: Numeric (0 - 10) (11/21/22 1338)  Pain Intensity 1: 0 (11/21/22 1338)   Managed    Pain score at or below stated goal level. Mental status & Level of consciousness: alert and oriented x 3    Neurological status: moves all extremities, sensation grossly intact    Pulmonary status: airway patent, adequate oxygenation. Complications related to anesthesia: none    Patient has met all PACU discharge requirements.       Audrey Muller DO

## 2022-11-21 NOTE — ANESTHESIA PREPROCEDURE EVALUATION
Relevant Problems   No relevant active problems       Anesthetic History   No history of anesthetic complications            Review of Systems / Medical History  Patient summary reviewed, nursing notes reviewed and pertinent labs reviewed    Pulmonary  Within defined limits                 Neuro/Psych         Psychiatric history  Pertinent negatives: No seizures, neuromuscular disease, TIA and CVA   Cardiovascular    Hypertension: well controlled            Pertinent negatives: No past MI, CAD, PAD, dysrhythmias, angina and CHF  Exercise tolerance: >4 METS     GI/Hepatic/Renal  Within defined limits              Endo/Other    Diabetes    Blood dyscrasia and arthritis  Pertinent negatives: No hypothyroidism, hyperthyroidism and obesity   Other Findings   Comments: H/o ITP           Physical Exam    Airway  Mallampati: II  TM Distance: 4 - 6 cm  Neck ROM: normal range of motion   Mouth opening: Normal     Cardiovascular  Regular rate and rhythm,  S1 and S2 normal,  no murmur, click, rub, or gallop             Dental  No notable dental hx       Pulmonary  Breath sounds clear to auscultation               Abdominal  GI exam deferred       Other Findings            Anesthetic Plan    ASA: 2  Anesthesia type: MAC and spinal          Induction: Intravenous  Anesthetic plan and risks discussed with: Patient and Family      Discussed both GA/spinal.  The increased risk of bleeding and subsequent nerve injury from spinal placement was discussed with patient and family. Her platelets are normal and the patient would like a spinal and accepts risks.

## 2022-11-21 NOTE — INTERVAL H&P NOTE
Update History & Physical    The Patient's History and Physical of November 20, 2022 was reviewed with the patient and I examined the patient. There was no change. The surgical site was confirmed by the patient and me. Plan:  The risk, benefits, expected outcome, and alternative to the recommended procedure have been discussed with the patient. Patient understands and wants to proceed with the procedure.     Electronically signed by Sandra Fung MD on 11/21/2022 at 9:40 AM

## 2022-11-21 NOTE — ANESTHESIA PROCEDURE NOTES
Peripheral Block    Start time: 11/21/2022 10:28 AM  End time: 11/21/2022 10:33 AM  Performed by: Jayden Linares DO  Authorized by: Jayden Linares DO       Pre-procedure: Indications: at surgeon's request and post-op pain management    Preanesthetic Checklist: patient identified, risks and benefits discussed, site marked, timeout performed, anesthesia consent given, patient being monitored and fire risk safety assessment completed and verbalized    Timeout Time: 10:28 EST      Block Type:   Block Type:   Adductor canal block  Laterality:  Right  Monitoring:  Heart rate, frequent vital sign checks, continuous pulse ox, standard ASA monitoring, responsive to questions and oxygen  Injection Technique:  Single shot  Procedures: ultrasound guided    Patient Position: supine (frog leg)  Prep: chlorhexidine    Location:  Mid thigh  Needle Type:  Stimuplex  Needle Gauge:  21 G  Needle Localization:  Ultrasound guidance  Med Admin Time: 11/21/2022 10:31 AM    Assessment:  Number of attempts:  1  Injection Assessment:  Incremental injection every 5 mL, local visualized surrounding nerve on ultrasound, negative aspiration for blood, no paresthesia, no intravascular symptoms and ultrasound image on chart  Patient tolerance:  Patient tolerated the procedure well with no immediate complications

## 2022-11-21 NOTE — DISCHARGE SUMMARY
[id number]   [address] [facility] [place] [address]     DISCHARGE SUMMARY     PATIENT: Lisbet Farrell     MRN: [id number]   ADMIT DATE: 2022   [facility]LL[facility]   DISCHARGE DATE: 2022     ATTENDING: Diamante Olivia MD   DICTATING: ERICA Fowler         ADMISSION DIAGNOSIS: Osteoarthritis of right knee [M17.11]    DISCHARGE DIAGNOSIS: Status post RIGHT TOTAL KNEE ARTHROPLASTY    HISTORY OF PRESENT ILLNESS: The patient is a 81y.o. year-old female   with ongoing right knee pain secondary to osteoarthritis of her right knee. The patient's pain has persisted and progressed despite conservative treatments and therapies. The patient has at this time opted for surgical intervention. PAST MEDICAL HISTORY:   Past Medical History:   Diagnosis Date    Arthritis     knees    Autoimmune disease ([facility] [address] 2005    ITTP    Chronic pain     knee    Coagulation disorder ([facility] [address]     Had ITTP in  but in remission platelets in normal range    Diabetes ([facility] [address]     Hypertension     Osteoarthritis of right knee 2022    Psychiatric disorder        PAST SURGICAL HISTORY:   Past Surgical History:   Procedure Laterality Date    HX APPENDECTOMY      HX HEENT      dental implant    HX ORTHOPAEDIC      bilaterl menicus repair    HX OTHER SURGICAL      colonoscopy x 3    OK ABDOMEN SURGERY PROC UNLISTED      tumor removed from colon       ALLERGIES:   Allergies   Allergen Reactions    Ace Inhibitors Cough        CURRENT MEDICATIONS:  A list of medications prior to the time of admission include:  Prior to Admission medications    Medication Sig Start Date End Date Taking? Authorizing Provider   aspirin delayed-release 81 mg tablet Take 1 Tablet by mouth two (2) times a day for 21 days. 22 Yes Christina Gambino PA   oxyCODONE IR (ROXICODONE) 5 mg immediate release tablet Take 1 Tablet by mouth every four (4) hours as needed for Pain for up to 7 days.  Max Daily Amount: 30 mg. 11/21/22 11/28/22 Yes Hilton Gambino PA   cefadroxil (DURICEF) 500 mg capsule Take 1 Capsule by mouth two (2) times a day for 5 days. 11/21/22 11/26/22 Yes Hilton Gambino PA   buPROPion XL (WELLBUTRIN XL) 150 mg tablet Take 150 mg by mouth daily. Yes Provider, Historical   cholecalciferol (VITAMIN D3) (1000 Units /25 mcg) tablet Take 1,000 Units by mouth daily. Yes Provider, Historical   hydroCHLOROthiazide (HYDRODIURIL) 25 mg tablet Take 25 mg by mouth daily. 9/20/22  Yes Provider, Historical   metFORMIN ER (GLUCOPHAGE XR) 500 mg tablet Take 500 mg by mouth Every morning. 7/13/22  Yes Provider, Historical   therapeutic multivitamin (THERAGRAN) tablet Take 1 Tablet by mouth daily. Yes Provider, Historical   olmesartan (BENICAR) 40 mg tablet Take 40 mg by mouth daily. Yes Provider, Historical   OTHER Take 1 Tablet by mouth daily. Zinc   Yes Provider, Historical       FAMILY HISTORY: History reviewed. No pertinent family history. SOCIAL HISTORY:   Social History     Socioeconomic History    Marital status:    Tobacco Use    Smoking status: Never    Smokeless tobacco: Never   Vaping Use    Vaping Use: Never used   Substance and Sexual Activity    Alcohol use: Yes     Comment: socially    Drug use: Not Currently     Comment: in 70's not currently       REVIEW OF SYSTEMS: All review of systems are negative. PHYSICAL EXAMINATION: For a detailed physical exam, please refer to the patient's chart. HOSPITAL COURSE: The patient was taken to surgery the day of admission. she underwent a right total knee replacement. Operative course was benign. Estimated blood loss was approximately 150 cc. The patient was taken to the PACU in stable condition and was later discharged home in stable condition. During her hospital stay, the patient progressed well with physical therapy and occupational therapy, adherent to instructions.  she had been cleared by physical therapy with stair training. she was placed on Aspirin for DVT prophylaxis. her pain has been well controlled with oral pain medications. her vitals have remained stable. she has also remained hemodynamically stable. The patient has been recommended for discharge home. DISCHARGE INSTRUCTIONS: The patient is to be discharged home with the following medication changes:     Discharge Medication List as of 11/21/2022  2:06 PM        START taking these medications    Details   aspirin delayed-release 81 mg tablet Take 1 Tablet by mouth two (2) times a day for 21 days. , Normal, Disp-42 Tablet, R-0      oxyCODONE IR (ROXICODONE) 5 mg immediate release tablet Take 1 Tablet by mouth every four (4) hours as needed for Pain for up to 7 days. Max Daily Amount: 30 mg., Normal, Disp-42 Tablet, R-0Supervising physician Dr David Bazzi MD      cefadroxil (DURICEF) 500 mg capsule Take 1 Capsule by mouth two (2) times a day for 5 days. , Normal, Disp-10 Capsule, R-0           CONTINUE these medications which have NOT CHANGED    Details   buPROPion XL (WELLBUTRIN XL) 150 mg tablet Take 150 mg by mouth daily. , Historical Med      cholecalciferol (VITAMIN D3) (1000 Units /25 mcg) tablet Take 1,000 Units by mouth daily. , Historical Med      hydroCHLOROthiazide (HYDRODIURIL) 25 mg tablet Take 25 mg by mouth daily. , Historical Med      metFORMIN ER (GLUCOPHAGE XR) 500 mg tablet Take 500 mg by mouth Every morning., Historical Med      therapeutic multivitamin (THERAGRAN) tablet Take 1 Tablet by mouth daily. , Historical Med      olmesartan (BENICAR) 40 mg tablet Take 40 mg by mouth daily. , Historical Med      OTHER Take 1 Tablet by mouth daily. Zinc, Historical Med               The patient is to continue at home with home physical therapy 3 times a week to work on gait training, range of motion, strengthening, and weightbearing exercises as tolerated on her right lower extremity. The patient is to progress from a walker to a cane to complete total weightbearing as tolerable. The patient is to continue to keep her incision dry. The patient is to followup with Jhonatan Munoz PA-C and/or [facility] in the office approximately 10-14 days status post for x-rays and further evaluation.     ERICA Márquez  11/22/2022

## 2022-11-21 NOTE — ANESTHESIA PROCEDURE NOTES
Spinal Block    Start time: 11/21/2022 11:40 AM  End time: 11/21/2022 11:44 AM  Performed by: Maggie Adame CRNA  Authorized by: Sawyer Carbajal DO     Pre-procedure: Indications: at surgeon's request, procedure for pain and primary anesthetic  Preanesthetic Checklist: patient identified, risks and benefits discussed, anesthesia consent, site marked, patient being monitored, timeout performed and fire risk safety assessment completed and verbalized    Timeout Time: 11:39 EST      Spinal Block:   Patient Position:  Seated  Prep Region:  Lumbar  Prep: chlorhexidine and patient draped      Location:  L4-5  Technique:  Single shot  Local: mepivacaine-pf (CARBOCAINE-PF) 1.5% PF injection - Other   45 mg - 11/21/2022 11:44:00 AM  Local Dose (mL):  3  Med Admin Time: 11/21/2022 11:44 AM    Needle:   Needle Type:   Fina  Needle Gauge:  24 G  Attempts:  1      Events: CSF confirmed, no blood with aspiration and no paresthesia        Assessment:  Insertion:  Uncomplicated  Patient tolerance:  Patient tolerated the procedure well with no immediate complications

## 2022-11-21 NOTE — PERIOP NOTES
TRANSFER - OUT REPORT:    Verbal report given to Ann Marie Damico (name) on Lisbet Farrell  being transferred to Phase II (unit) for routine progression of care       Report consisted of patients Situation, Background, Assessment and   Recommendations(SBAR). Information from the following report(s) SBAR, Kardex, Procedure Summary, Intake/Output, and MAR was reviewed with the receiving nurse. Lines:   Peripheral IV 11/21/22 Left Hand (Active)   Site Assessment Clean, dry, & intact 11/21/22 1310   Phlebitis Assessment 0 11/21/22 1310   Infiltration Assessment 0 11/21/22 1310   Dressing Status Clean, dry, & intact 11/21/22 1310   Dressing Type Transparent;Tape 11/21/22 1310   Hub Color/Line Status Infusing 11/21/22 1310        Opportunity for questions and clarification was provided.       Patient transported with:   CNA

## 2022-11-21 NOTE — PERIOP NOTES
Pt. Used restroom in pre-op area with assistance. Patient placed on Lobito Paws for a minimum of 30 min in  Preop.

## 2022-11-22 ENCOUNTER — HOME CARE VISIT (OUTPATIENT)
Dept: SCHEDULING | Facility: HOME HEALTH | Age: 72
End: 2022-11-22
Payer: MEDICARE

## 2022-11-22 VITALS
RESPIRATION RATE: 16 BRPM | HEART RATE: 61 BPM | DIASTOLIC BLOOD PRESSURE: 78 MMHG | SYSTOLIC BLOOD PRESSURE: 140 MMHG | TEMPERATURE: 98.4 F | OXYGEN SATURATION: 97 %

## 2022-11-22 PROCEDURE — 3331090002 HH PPS REVENUE DEBIT

## 2022-11-22 PROCEDURE — 400013 HH SOC

## 2022-11-22 PROCEDURE — 400018 HH-NO PAY CLAIM PROCEDURE

## 2022-11-22 PROCEDURE — G0151 HHCP-SERV OF PT,EA 15 MIN: HCPCS

## 2022-11-22 PROCEDURE — 3331090001 HH PPS REVENUE CREDIT

## 2022-11-22 NOTE — PROGRESS NOTES
Problem: Self Care Deficits Care Plan (Adult)  Goal: *Acute Goals and Plan of Care (Insert Text)  Description: Initial Occupational Therapy Goals (11/21/2022) Within 7 day(s):    1. Patient will perform grooming standing sinkside with supervision for increased independence with ADLs. 2. Patient will perform LB dressing with supervision & A/E PRN for increased independence with ADLs. 3. Patient will perform toilet transfer with supervision for increased independence with ADLs. 4. Patient will perform all aspects of toileting with supervision for increased independence with ADLs. 5. Patient will independently apply energy conservation techniques with 1 verbal cue(s)for increased independence with ADLs. 6. Patient will perform bathroom mobility with supervision for increased independence/safety with ADLs. Outcome: Progressing Towards Goal  OCCUPATIONAL THERAPY EVALUATION    Patient: Harman Boast (84 y.o. female)  Date: 11/21/2022  Primary Diagnosis: Osteoarthritis of right knee [M17.11]  Procedure(s) (LRB):  RIGHT TOTAL KNEE REPLACEMENT (SUBVASTUS) (Right) Day of Surgery   Precautions: Fall, WBAT  PLOF: pt for ADLs/functional mobility    ASSESSMENT AND RECOMMENDATIONS:  Based on the objective data described below, the patient presents with RLE decreased ROM and strength affecting LE ADLs. Pt found supine on stretcher, vitals assessed and WNL, pt reporting pain 0/10, agreeable to therapy. Educated pt on proper body mechanics for ADLs s/p TKR. Pt completed upper body dressing with supervision. Pt able to thread B feet through underwear/pants without assist, and CGA when standing to pull up to waist. Pt required CGA for STS/bathroom mobility with vc for safe use of RW. Pt ambulated back to recliner, ice applied to R knee. Spouse present during session for education on home safety. Provided opportunity for pt to voice questions on ADL performance when home, pt has no further concerns.  Patient will benefit from skilled Occupational Therapy intervention to maximize safety/independence with ADLs at d/c.    Education: Reviewed home safety, body mechanics, importance of moving every hour to prevent joint stiffness, role of ice for edema/pain control, Rolling Walker management/safety, and adaptive dressing techniques with patient verbalizing  understanding at this time     Patient will benefit from skilled intervention to address the above impairments. Patient's rehabilitation potential is considered to be Good  Factors which may influence rehabilitation potential include:   [x]             None noted  []             Mental ability/status  []             Medical condition  []             Home/family situation and support systems  []             Safety awareness  []             Pain tolerance/management  []             Other:        PLAN :  Recommendations and Planned Interventions:   [x]               Self Care Training                  [x]      Therapeutic Activities  [x]               Functional Mobility Training   []      Cognitive Retraining  [x]               Therapeutic Exercises           []      Endurance Activities  [x]               Balance Training                    []      Neuromuscular Re-Education  []               Visual/Perceptual Training     [x]      Home Safety Training  [x]               Patient Education                   [x]      Family Training/Education  []               Other (comment):    Frequency/Duration: Patient will be followed by Occupational Therapy 1-2 times per day/4-7 days per week to address goals. Discharge Recommendations: Home health with adult supervision at least 24 hours after d/c  Further Equipment Recommendations for Discharge: N/A    AMPAC: Based on an AM-PAC score of 19/24 and their current ADL deficits; it is recommended that the patient have 2-3 sessions per week of Occupational Therapy at d/c to increase the patient's independence.       This AMPAC score should be considered in conjunction with interdisciplinary team recommendations to determine the most appropriate discharge setting. Patient's social support, diagnosis, medical stability, and prior level of function should also be taken into consideration. SUBJECTIVE:   Patient stated I feel pretty good.     OBJECTIVE DATA SUMMARY:     Past Medical History:   Diagnosis Date    Arthritis     knees    Autoimmune disease ([facility] [address] 2005    ITTP    Chronic pain     knee    Coagulation disorder ([facility] [address]     Had ITTP in 2005 but in remission platelets in normal range    Diabetes ([facility] [address]     Hypertension     Osteoarthritis of right knee 11/20/2022    Psychiatric disorder      Past Surgical History:   Procedure Laterality Date    HX APPENDECTOMY  2022    HX HEENT      dental implant    HX ORTHOPAEDIC  2004    bilaterl menicus repair    HX OTHER SURGICAL      colonoscopy x 3    [place] ABDOMEN SURGERY PROC UNLISTED  2022    tumor removed from colon     Barriers to Learning/Limitations: yes;  physical, post-anesthesia  Compensate with: visual, verbal, tactile, kinesthetic cues/model    Home Situation/Prior Level of Function:   Home Situation  Home Environment: Private residence  # Steps to Enter: 2  One/Two Story Residence: Two story  # of Interior Steps: 14  Interior Rails: Right  Lift Chair Available: No  Living Alone: No  Support Systems: Spouse/Significant Other  Patient Expects to be Discharged to[de-identified] Home with home health  Current DME Used/Available at Home: Raised toilet seat, Walker, rolling  Tub or Shower Type: Shower  []  Right hand dominant   []  Left hand dominant    Cognitive/Behavioral Status:  Neurologic State: Alert  Orientation Level: Oriented to person;Oriented to place;Oriented to situation  Cognition: Follows commands  Safety/Judgement: Awareness of environment    Skin: R knee incision w/ Mepilex   Edema: compression hose in place & applied ice     Coordination: BUE  Coordination: Within functional limits  Fine Motor Skills-Upper: Left Intact; Right Intact    Gross Motor Skills-Upper: Left Intact; Right Intact    Balance:  Sitting: Intact  Standing: Intact; With support    Strength: BUE  Strength: Generally decreased, functional    Tone & Sensation:BUE  Tone: Normal  Sensation: Impaired (R knee)    Range of Motion: BUE  AROM: Generally decreased, functional  PROM: Generally decreased, functional    Functional Mobility and Transfers for ADLs:  Bed Mobility:  Supine to Sit: Stand-by assistance  Scooting: Stand-by assistance  Transfers:  Sit to Stand: Contact guard assistance (vc)    ADL Assessment:  Feeding: Independent  Oral Facial Hygiene/Grooming: Stand-by assistance  Bathing: Contact guard assistance  Upper Body Dressing: Supervision  Lower Body Dressing: Contact guard assistance  Toileting: Stand by assistance    ADL Intervention:  Upper Body Dressing Assistance  Dressing Assistance: Supervision  Pullover Shirt: Supervision    Lower Body Dressing Assistance  Dressing Assistance: Contact guard assistance  Underpants: Contact guard assistance  Pants With Elastic Waist: Contact guard assistance  Leg Crossed Method Used: No  Position Performed: Seated edge of bed  Cues: Verbal cues provided;Visual cues provided    Cognitive Retraining  Safety/Judgement: Awareness of environment    Pain:  Pain level pre-treatment: 0/10  Pain level post-treatment: 0/10  Pain Intervention(s): Rest, Ice, Repositioning   Response to intervention: Nurse notified, see doc flow     Activity Tolerance:   Fair. Patient able to stand ~5 minute(s). Patient able to complete ADLs with intermittent rest breaks. Patient limited by pain, strength, ROM. Patient unsteady. Please refer to the flowsheet for vital signs taken during this treatment.   After treatment:   [x]  Patient left in no apparent distress sitting up in chair  []  Patient sitting on EOB  []  Patient left in no apparent distress in bed  [x]  Call bell left within reach  [x]  Nursing notified  []  Caregiver present  [x]  Ice applied  []  SCD's on while back in bed  [] Bed alarm activated    COMMUNICATION/EDUCATION:   Communication/Collaboration:  [x]       Role of Occupational Therapy in the acute care setting. [x]      Home safety education was provided and the patient/caregiver indicated understanding. [x]      Patient/family have participated as able in goal setting and plan of care. [x]      Patient/family agree to work toward stated goals and plan of care. []      Patient understands intent and goals of therapy, but is neutral about his/her participation. []      Patient is unable to participate in plan of care at this time. Thank you for this referral.  Yamilet Modi OTR/L  Time Calculation: 31 mins    Eval Complexity: History: MEDIUM Complexity : Expanded review of history including physical, cognitive and psychosocial  history ; Examination: LOW Complexity : 1-3 performance deficits relating to physical, cognitive , or psychosocial skils that result in activity limitations and / or participation restrictions ; Decision Making:LOW Complexity : No comorbidities that affect functional and no verbal or physical assistance needed to complete eval tasks     [address] [place] [id number] AM-PAC® Daily Activity Inpatient Short Form (6-Clicks)*    How much HELP from another person does the patient currently need    (If the patient hasn't done an activity recently, how much help from another person do you think he/she would need if he/she tried?)   Total (Total A or Dep)   A Lot  (Mod to Max A)   A Little (Sup or Min A)   None (Mod I to I)   Putting on and taking off regular lower body clothing? [] 1 [] 2 [x] 3 [] 4   2. Bathing (including washing, rinsing,      drying)? [] 1 [] 2 [x] 3 [] 4   3. Toileting, which includes using toilet, bedpan or urinal?   [] 1 [] 2 [x] 3 [] 4   4. Putting on and taking off regular upper body clothing? [] 1 [] 2 [x] 3 [] 4   5.  Taking care of personal grooming such as brushing teeth?   [] 1 [] 2 [x] 3 [] 4   6. Eating meals? [] 1 [] 2 [] 3 [x] 4     Based on an AM-PAC score of 19/24 and their current ADL deficits; it is recommended that the patient have 2-3 sessions per week of Occupational Therapy at d/c to increase the patient's independence.

## 2022-11-22 NOTE — HOME HEALTH
Skilled Reason for admission/summary of clinical condition:  R TKR . 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    Chronic pain        knee    Coagulation disorder ([facility]        Had ITTP in 2005 but in remission platelets in normal range    Diabetes ([facility]      Hypertension      Osteoarthritis of right knee 11/20/20 22    Psychiatric disorder       SUBJECTIVE: \"I'm hardly having any pain\"  LIVING SITUATION: Pt lives with her wife in a 2 story home with bedroom and full bathroom upstairs  CAREGIVER INVOLVEMENT/REQUIRES ASSISTANCE FOR: Transportation, meal prep, med management, mobility, ADL's  PLOF: Pt was I with all mobility and ADL's including driving and community mobility w/o AD. MEDICATIONS REVIEWED AND RECONCILED:  Med rec done with no issues. High risk medication teaching regarding, Metformin and oxycodone. Instructed patient/caregiver these medications may result in lower blood sugars than body is accustomed to, notify SN/PT if dizziness, confusion, nervousness, or any other symptoms of low blood sugar. Instructed patient/caregiver to take exact amount prescribed, signs and symptoms of oversedation, notify SN/PT if oversedated. May cause constipation, notify SN/PT if no BM x 3 days. MD notified of any discrepancies/look a like medications/medication interactions: no severe interaction noted  NEXT MD APPT: 12/5 with Dr Alexander Ray  ROM: 6 - 86.  18 degree / lag  STRENGTH: R hip 3+/5, R knee 2-/5, R ankle 4/5. L LE 5/5  WOUNDS: R TKR incision is covered with Mepilex marked change 11/28. Dressing is clean, dry, intact with a few pinprick areas of old drainage noted. No s/s of infection  BED MOBILITY: I supine<>sit and rolling L/R  TRANSFERS: S sig<>stand from low recliner, EOB, raised toilet. Cues provided to widen LORI and lean forward for easier transition  GAIT: Pt amb in the home up to 50' with FWW and SBA.   She needs cuing to slow down and use the walker correctly/safely as she moves quickly and sometimes sets the walker aside to go through doors or narrow spaces. PT also recommended removing several area rugs that were getting the walker legs caught. Gait characterized by decreased weight shift to R, decreased R step length. STAIRS: CG up/down 12 steps to 2nd floor with pt using single hand rail and PT carrying FWW  BALANCE: Pt scored 17/28 on Tinetti Balance Assessment placing her at high risk for falls. PATIENT RESPONE TO TX: Patient demonstrated a positive outcome post treatment and reported a reduction in pain, increased comfort and increased confidence with transfers and mobility. Patient reported good understanding of the HEP and reports confidence in ability to complete the program as prescribed by PT. PATIENT EDUCATION PROVIDED THIS VISIT: safety, HEP, walking, deep breathing, s/s of infection, management of swelling      PATIENT LEVEL OF UNDERSTANDING OF EDUCATION PROVIDED: Patient expressed understanding of all education provided and was able to repeat back education, precautions, and HEP. HEP consisting of:  1. Walking every hour during the day with FWW and SBA   2. Supine: ankle pumps, quad sets, glut sets, heel slides. Seated: LAQ. All x 10 2-3x/day  Written HEP issued (from hospital), patient/caregiver verbalized understanding. ASSESSMENT/CONTINUED NEED FOR THE FOLLOWING SKILLS:  Pt presents with: decreased ROM, decreased strength, impaired gait, decreased ability w stair negotiation, increased swelling, decreased transfer status, decreased endurance, decreased balance and decreased safety, increased pain. HHPT is medically necessary in order to improve functional mobility/quality of life, decrease burden of care, reduce risk for re-hospitalization, work towards patient's personal goals of return to PLOF w decrease risk for falls.   Goals established for increased independence in the home, safe mobility in the home, improvement in strength and ROM - all designed to reduce fall risk and progress toward independence. Patient will benefit from PT intervention to progress toward meeting all established goals     PLAN: PT 3W2, 1W1 for progression of mobility, ther ex for strength, ROM, provide education to patient and caregivers to maximize the recovery and reduce the fall risk to progress toward a return to independent function      DISCHARGE PLANNING DISCUSSED: Discharge to self and family under MD supervision once all goals have been met or patient has reached max potential. Patient/caregiver verbalized understanding. Dr Andrew Argueta office notified of admission via case communication.

## 2022-11-22 NOTE — Clinical Note
Ms Lyn Hebert was seen today for home care admission and PT eval.  She had no medication issues, pain is controlled, her bandage is clean and intact, she is mobile in her home with a walker. Initial R knee ROM: 6 - 86    PLAN: PT 3W2, 1W1 for progression of mobility, ther ex for strength, ROM, provide education to patient and caregivers to maximize the recovery and reduce the fall risk to progress toward a return to independent function      DISCHARGE PLANNING DISCUSSED: Discharge to self and family under MD supervision once all goals have been met or patient has reached max potential. Patient/caregiver verbalized understanding.     Thank you for this referral.

## 2022-11-23 ENCOUNTER — HOME CARE VISIT (OUTPATIENT)
Dept: SCHEDULING | Facility: HOME HEALTH | Age: 72
End: 2022-11-23
Payer: MEDICARE

## 2022-11-23 VITALS
SYSTOLIC BLOOD PRESSURE: 118 MMHG | TEMPERATURE: 97.6 F | OXYGEN SATURATION: 98 % | HEART RATE: 76 BPM | DIASTOLIC BLOOD PRESSURE: 66 MMHG

## 2022-11-23 PROCEDURE — G0157 HHC PT ASSISTANT EA 15: HCPCS

## 2022-11-23 PROCEDURE — 3331090002 HH PPS REVENUE DEBIT

## 2022-11-23 PROCEDURE — 3331090001 HH PPS REVENUE CREDIT

## 2022-11-24 PROCEDURE — 3331090002 HH PPS REVENUE DEBIT

## 2022-11-24 PROCEDURE — 3331090001 HH PPS REVENUE CREDIT

## 2022-11-24 NOTE — HOME HEALTH
SUBJECTIVE: \"The block wore off a 3am this morning. \"  The pt states she is taking the pain medication on schedule every 4 hours. She states she had a regular BM since surgery. Tomi Lee DATE OF SURGERY: 11/21/22 RTKR with Dr. Dee Dee Lazo  . PAIN: see pain assessment  OBJECTIVE: see interventions  . NEXT MD APPT: 12/5/22  . CAREGIVER ASSISTANCE NEEDED FOR: The pt lives with her wife who assists with care as needed: transportation, shopping, meals, medication management, safety. .  ASSESSMENT AND PROGRESS TOWARD GOALS:  The pt presents with increased pain and edema with decreased strength and ROM requiring the use of an AD and increased CG support at this time within range on being in the first week of a TKR. She will benefit from continued HHPT to improve the safety with functional mobility, monitor the incision and return the pt to safe community ambulation to be able to get to and from medial appts safely. PATIENT RESPONSE TO TREATMENT: temporary increases in pain, but able to do all tasks asked of her. PATIENT LEVEL OF UNDERSTANDING OF EDUCATION: Good - the pt able to teach back the HEP with the use of the handouts. .  CONTINUED NEED FOR THE FOLLOWING SKILLS: HH PT is medically necessary to address pain, decreased ROM, decreased strength, increased swelling, impaired bed mobility, decreased independence with functional transfers, impaired gait, impaired stair negotiation, and impaired balance in order to improve functional independence, quality of life, return to PLOF, reduce the risk for falls, and reduce pain. Tomi Jesus PLAN: Plan to progress the standing ther exs to vladislav LEs promoting SLS on the RLE next visti. DISCHARGE PLANNING DISCUSSED: Discharge to self and family under MD supervision once all goals have been met or patient has reached maximum potential. Discussed with the pt the POC to continue HHPT x 1 visit this week then 3w1, 1w1. The pt is in agreement to the POC at this time.

## 2022-11-25 ENCOUNTER — HOME CARE VISIT (OUTPATIENT)
Dept: HOME HEALTH SERVICES | Facility: HOME HEALTH | Age: 72
End: 2022-11-25
Payer: MEDICARE

## 2022-11-25 ENCOUNTER — HOME CARE VISIT (OUTPATIENT)
Dept: SCHEDULING | Facility: HOME HEALTH | Age: 72
End: 2022-11-25
Payer: MEDICARE

## 2022-11-25 VITALS
OXYGEN SATURATION: 98 % | DIASTOLIC BLOOD PRESSURE: 80 MMHG | TEMPERATURE: 98.3 F | SYSTOLIC BLOOD PRESSURE: 148 MMHG | HEART RATE: 80 BPM

## 2022-11-25 PROCEDURE — 3331090001 HH PPS REVENUE CREDIT

## 2022-11-25 PROCEDURE — 3331090002 HH PPS REVENUE DEBIT

## 2022-11-25 PROCEDURE — G0157 HHC PT ASSISTANT EA 15: HCPCS

## 2022-11-25 NOTE — HOME HEALTH
SUBJECTIVE: The pt reports compliance with the HEP  PATIENT EDUCATION: Pt education for increasing elevation positioning due to continued edema throughout the RLE. Graham Sutton DATE OF SURGERY: 11/21/22 RTKR with Dr. Mariza Maguire  . PAIN: see pain assessment    OBJECTIVE: see interventions  . NEXT MD APPT: 12/5/22  . CAREGIVER ASSISTANCE NEEDED FOR: The pt lives with her wife who assists with care as needed: transportation, shopping, meals, medication management, safety. .  ASSESSMENT AND PROGRESS TOWARD GOALS:  The pt demonstrated a positive result in HHPT on this date with the ability to now perform the stairs managing her own walker; however, continues to require S for safety. Her ROM islimited, but improving and she is now able to get off the couch, mod(I). She continues to present with weakness and increased pain requiring the use oft the AD for all mobility and will benefit from continued HHPT to progress towards safe functional mobility and the ability to safely exit the home to get to and from medical appts. PATIENT RESPONSE TO TREATMENT: 4/10 pain level pre PT, 5/10 pain level post PT. PATIENT LEVEL OF UNDERSTANDING OF EDUCATION: Good - the pt able to teach back the HEP with the use of the handouts. .  CONTINUED NEED FOR THE FOLLOWING SKILLS: HH PT is medically necessary to address pain, decreased ROM, decreased strength, increased swelling, impaired bed mobility, decreased independence with functional transfers, impaired gait, impaired stair negotiation, and impaired balance in order to improve functional independence, quality of life, return to PLOF, reduce the risk for falls, and reduce pain. PLAN: Plan to progress the standing ther exs to vladislav LEs promoting SLS on the RLE next visti. DISCHARGE PLANNING DISCUSSED: Discharge to self and family under MD supervision once all goals have been met or patient has reached maximum potential. Discussed with the pt the POC to continue HHPT  3w1, 1w1.  The pt is in agreement to the POC at this time.

## 2022-11-26 PROCEDURE — 3331090002 HH PPS REVENUE DEBIT

## 2022-11-26 PROCEDURE — 3331090001 HH PPS REVENUE CREDIT

## 2022-11-27 PROCEDURE — 3331090002 HH PPS REVENUE DEBIT

## 2022-11-27 PROCEDURE — 3331090001 HH PPS REVENUE CREDIT

## 2022-11-28 ENCOUNTER — HOME CARE VISIT (OUTPATIENT)
Dept: SCHEDULING | Facility: HOME HEALTH | Age: 72
End: 2022-11-28
Payer: MEDICARE

## 2022-11-28 VITALS
OXYGEN SATURATION: 97 % | SYSTOLIC BLOOD PRESSURE: 120 MMHG | DIASTOLIC BLOOD PRESSURE: 74 MMHG | TEMPERATURE: 97.5 F | HEART RATE: 82 BPM

## 2022-11-28 PROCEDURE — G0157 HHC PT ASSISTANT EA 15: HCPCS

## 2022-11-28 PROCEDURE — 3331090001 HH PPS REVENUE CREDIT

## 2022-11-28 PROCEDURE — 3331090002 HH PPS REVENUE DEBIT

## 2022-11-28 NOTE — HOME HEALTH
SUBJECTIVE: The pt reports continued compliance with the HEP. Stacie King DATE OF SURGERY: 11/21/22 RTKR with Dr. Barber Arreaga  . PAIN: see pain assessment    OBJECTIVE: see interventions  . NEXT MD APPT: 12/5/22  . CAREGIVER ASSISTANCE NEEDED FOR: The pt lives with her wife who assists with care as needed: transportation, shopping, meals, medication management, safety. .  ASSESSMENT AND PROGRESS TOWARD GOALS:  The pt demonstrated a positive result in HHPT on this date as evidence of an improved Tinetti Balance test of 22/28 on this date improved from a 17/28 at the initial evaluation. She is improving with pain management and mobility as she was able to begin weaning to the [facility]; however, she continues to require S for the cane and will benefit from use of the walker when exiting the home at this time. The pt continues to present with increased edema and decreased ROM requiring the use of and AD at all times. PATIENT RESPONSE TO TREATMENT: Improved safety with descending the stairs with vladislav UEs on the HR and step to pattern. PATIENT LEVEL OF UNDERSTANDING OF EDUCATION: Good - the pt able to teach back the HEP with the use of the handouts. .  CONTINUED NEED FOR THE FOLLOWING SKILLS: HH PT is medically necessary to address pain, decreased ROM, decreased strength, increased swelling, impaired bed mobility, decreased independence with functional transfers, impaired gait, impaired stair negotiation, and impaired balance in order to improve functional independence, quality of life, return to PLOF, reduce the risk for falls, and reduce pain. PLAN: Plan to readdress ambulation with the [facility] again next visit. DISCHARGE PLANNING DISCUSSED: Discharge to self and family under MD supervision once all goals have been met or patient has reached maximum potential. Discussed with the pt the POC to continue HHPT x 2 visits this week then 1w1. The pt is in agreement to the POC at this time.

## 2022-11-29 PROCEDURE — 3331090002 HH PPS REVENUE DEBIT

## 2022-11-29 PROCEDURE — 3331090001 HH PPS REVENUE CREDIT

## 2022-11-30 ENCOUNTER — HOME CARE VISIT (OUTPATIENT)
Dept: SCHEDULING | Facility: HOME HEALTH | Age: 72
End: 2022-11-30
Payer: MEDICARE

## 2022-11-30 VITALS
TEMPERATURE: 97.8 F | SYSTOLIC BLOOD PRESSURE: 128 MMHG | HEART RATE: 85 BPM | DIASTOLIC BLOOD PRESSURE: 66 MMHG | OXYGEN SATURATION: 99 %

## 2022-11-30 PROCEDURE — G0157 HHC PT ASSISTANT EA 15: HCPCS

## 2022-11-30 PROCEDURE — 3331090002 HH PPS REVENUE DEBIT

## 2022-11-30 PROCEDURE — 3331090001 HH PPS REVENUE CREDIT

## 2022-11-30 NOTE — HOME HEALTH
SUBJECTIVE:The pt reports significant increased pain yesterday so she did not do any of her HEP. She reports it has since reduced. Remedios Morton DATE OF SURGERY: 11/21/22 RTKR with Dr. Tiago Ruano  . PAIN: see pain assessment    OBJECTIVE: see interventions  . NEXT MD APPT: 12/5/22  . CAREGIVER ASSISTANCE NEEDED FOR: The pt lives with her wife who assists with care as needed: transportation, shopping, meals, medication management, safety. .  ASSESSMENT AND PROGRESS TOWARD GOALS:  The pt demonstrated a positive result in HHPT on this date as evidence of an improved Tinetti Balance test of 23/28 on this date improved from 22/28 last visit. She continues to present with increased pain and edema, but improved overall. She does report that she continues to take the pain medication every 4 hours for management of the pain along with the use of ice and rest. She was able to ambulate on outside surfaces with symmetrical stepping pattern and full foot clearance with S for safety with the SPC. She continues to make progress towards safe functional mobility and the resolution of HHPT goals. She will benefit from 2 more HHPT visits to address strength, pain management, incision monitoring and safety with mobility. PATIENT RESPONSE TO TREATMENT:  4/10 pain level pre PT, 6/10 pain level of tightness post PT. PATIENT LEVEL OF UNDERSTANDING OF EDUCATION: Good - the pt able to teach back the HEP with the use of the handouts. PLAN: review the HEP, bandage change. DISCHARGE PLANNING DISCUSSED: Discharge to self and family under MD supervision once all goals have been met or patient has reached maximum potential. Discussed with the pt the POC to continue HHPT x 1 visit this week then 1w1. The pt is in agreement to the POC at this time. [facility] signed on this date with a DC date of 12/5/22.

## 2022-12-01 PROCEDURE — 3331090002 HH PPS REVENUE DEBIT

## 2022-12-01 PROCEDURE — 3331090001 HH PPS REVENUE CREDIT

## 2022-12-02 ENCOUNTER — HOME CARE VISIT (OUTPATIENT)
Dept: SCHEDULING | Facility: HOME HEALTH | Age: 72
End: 2022-12-02
Payer: MEDICARE

## 2022-12-02 VITALS
HEART RATE: 80 BPM | SYSTOLIC BLOOD PRESSURE: 132 MMHG | DIASTOLIC BLOOD PRESSURE: 84 MMHG | OXYGEN SATURATION: 98 % | TEMPERATURE: 97.3 F

## 2022-12-02 PROCEDURE — G0157 HHC PT ASSISTANT EA 15: HCPCS

## 2022-12-02 NOTE — CASE COMMUNICATION
The pt has progressed nicely towards HHPT goals as noted below. She continues to present with moderate edema. Her incision is healing with no s/s of infection and she is managing her pain with medication, ice, and rest. She will be discussing a transition to outpatient PT at her f/u appt on Monday.    Assessment and Summary of Care:  Patient's current functional status before discharge is as follows    ROM:  the pt is lacking 2 degrees  end range knee ext of the RLE in supine and able to achieve 105 degrees active R knee flex in supine  Bed Mobility: (I)  Transfers: mod(I)  Gait: mod(I) with the STC with symmetrical stepping pattern and full foot clearance on outside surfaces for distances of 200 ft  Stairs: mod(I) with the STC, unilateral HR, and step to pattern to ascend and vladislav UEs on the unilateral HR to descend  Special Tests: Tinetti Balance test 23/28  Recommend ations: transition to outpatient PT.

## 2022-12-02 NOTE — Clinical Note
thank you  ----- Message -----  From: Joe Godoy, PTA  Sent: 12/2/2022   4:33 PM EST  To: Demi Connors PT       The pt has progressed nicely towards HHPT goals as noted below. She continues to present with moderate edema. Her incision is healing with no s/s of infection and she is managing her pain with medication, ice, and rest. She will be discussing a transition to outpatient PT at her f/u appt on Monday.    Assessment and Summary of Care:  Patient's current functional status before discharge is as follows    ROM:  the pt is lacking 2 degrees end range knee ext of the RLE in supine and able to achieve 105 degrees active R knee flex in supine  Bed Mobility: (I)  Transfers: mod(I)  Gait: mod(I) with the STC with symmetrical stepping pattern and full foot clearance on outside surfaces for distances of 200 ft  Stairs: mod(I) with the STC, unilateral HR, and step to pattern to ascend and vladislav UEs on the unilateral HR to descend  Special Tests: Tinetti Balance test 23/28  Recommendations: transition to outpatient PT.

## 2022-12-02 NOTE — HOME HEALTH
SUBJECTIVE: The pt states \"I think I've turned a corner. \"  . DATE OF SURGERY: 11/21/22 RTKR with Dr. Chuy Giordano  . PAIN: see pain assessment    OBJECTIVE: see interventions  . NEXT MD APPT: 12/5/22 for post surgical f/u appt  . CAREGIVER ASSISTANCE NEEDED FOR: The pt lives with her wife who assists with care as needed: transportation, shopping, meals, medication management, safety. .  ASSESSMENT AND PROGRESS TOWARD GOALS:  The pt has progressed nicely towards HHPT goals as noted below. She continues to present with moderate edema. Her incision is healing with no s/s of infection and she is managing her pain with medication, ice, and rest. She will be discussing a transition to outpatient PT at her f/u appt on Monday. Assessment and Summary of Care:  Patient's current functional status before discharge is as follows    ROM:  the pt is lacking 2 degrees end range knee ext of the RLE in supine and able to achieve 105 degrees active R knee flex in supine  Bed Mobility: (I)  Transfers: mod(I)  Gait: mod(I) with the STC with symmetrical stepping pattern and full foot clearance on outside surfaces for distances of 200 ft  Stairs: mod(I) with the STC, unilateral HR, and step to pattern to ascend and vladislav UEs on the unilateral HR to descend  Special Tests: Tinetti Balance test 23/28  Recommendations: transition to outpatient PT. PATIENT RESPONSE TO TREATMENT:  The pt able to demonstrate good technique with the ther exs    PATIENT LEVEL OF UNDERSTANDING OF EDUCATION: Good - the pt able to teach back the HEP with the use of the handouts. PLAN: Reassessment with planned DC at that time with Albino Ca PT.     DISCHARGE PLANNING DISCUSSED: Discharge to self and family under MD supervision once all goals have been met or patient has reached maximum potential. Discussed with the pt the POC to continue HHPT  1w1. The pt is in agreement to the POC at this time.

## 2022-12-05 ENCOUNTER — HOME CARE VISIT (OUTPATIENT)
Dept: SCHEDULING | Facility: HOME HEALTH | Age: 72
End: 2022-12-05
Payer: MEDICARE

## 2022-12-05 PROCEDURE — G0151 HHCP-SERV OF PT,EA 15 MIN: HCPCS

## 2022-12-05 NOTE — Clinical Note
Patient is s/p R TKR and has been treated for ROM, strengthening, gait training, stair training, HEP training, safety training, and balance training. Pt has made progress and met PT goals. ROM: 0 - 105. ROM at [facility]:  6 - 86.  18 degree / lag  STRENGTH:  R hip 4/5, knee 3+/5, ankle 5/5. 5 STS = 16 sec from EOB. Strength at [facility]: R hip 3+/5, R knee 2-/5, R ankle 4/5. L LE 5/5  WOUNDS: R knee incision is open to air with scabbing along incision line. Edges are approximated with no s/s of infection. Wound status at [facility]:  R TKR incision is covered with Mepilex marked change 11/28. Dressing is clean, dry, intact with a few pinprick areas of old drainage noted. No s/s of infection  BED MOBILITY: I supine<>sit and rolling L/R. Bed mobility at [facility]: I supine<>sit and rolling L/R  TRANSFERS: I sit<>stand from varied surfaces. I car transfer per pt and her spouse. Transfers at [facility]: S sig<>stand from low recliner, EOB, raised toilet. Cues provided to widen LORI and lean forward for easier transition  GAIT: Pt amb I through the house with [facility]. Amb I outside on uneven surface per LPTA. Gait characterized by decreased knee / in stance phase. Gait at [facility]: Pt amb in the home up to 50' with FWW and SBA. She needs cuing to slow down and use the walker correctly/safely as she moves quickly and sometimes sets the walker aside to go through doors or narrow spaces. PT also recommended removing several area rugs that were getting the walker legs caught. Gait characterized by decreased weight shift to R, decreased R step length. STAIRS: I up/down steps to 2nd floor with cane and rail. Stairs at [facility] CG up/down 12 steps to 2nd floor with pt using single hand rail and PT carrying FWW  BALANCE: Pt scored 25/28 on Tinetti Balance Assessment placing her at low risk for falls.   Balance at [facility]: Pt scored 17/28 on Tinetti Balance Assessment placing her at high risk for falls  Discharge plan: Discharge from [address] [place] [address] services as all goals have been met. Dr Luis Manuel Mondragon office has been notified of DC from [address] with goals met.   Pt plans to begin outpt PT this week

## 2022-12-06 VITALS
SYSTOLIC BLOOD PRESSURE: 142 MMHG | OXYGEN SATURATION: 97 % | TEMPERATURE: 97.7 F | RESPIRATION RATE: 16 BRPM | HEART RATE: 81 BPM | DIASTOLIC BLOOD PRESSURE: 78 MMHG

## 2022-12-06 NOTE — HOME HEALTH
SUBJECTIVE: \"My knee still feels stiff\"  REQUIRES CAREGIVER ASSISTANCE FOR: Transportation  MEDICATIONS REVIEWED AND RECONCILED: Med rec done with no issues  NEXT MD APPT: 6 weeks  ROM: 0 - 105. ROM at [facility]:  6 - 86.  18 degree / lag  STRENGTH:  R hip 4/5, knee 3+/5, ankle 5/5. 5 STS = 16 sec from EOB. Strength at [facility]: R hip 3+/5, R knee 2-/5, R ankle 4/5. L LE 5/5  WOUNDS: R knee incision is open to air with scabbing along incision line. Edges are approximated with no s/s of infection. Wound status at [facility]:  R TKR incision is covered with Mepilex marked change 11/28. Dressing is clean, dry, intact with a few pinprick areas of old drainage noted. No s/s of infection  BED MOBILITY: I supine<>sit and rolling L/R. Bed mobility at [facility]: I supine<>sit and rolling L/R  TRANSFERS: I sit<>stand from varied surfaces. I car transfer per pt and her spouse. Transfers at [facility]: S sig<>stand from low recliner, EOB, raised toilet. Cues provided to widen LORI and lean forward for easier transition  GAIT: Pt amb I through the house with [facility]. Amb I outside on uneven surface per LPTA. Gait characterized by decreased knee / in stance phase. Gait at [facility]: Pt amb in the home up to 50' with FWW and SBA. She needs cuing to slow down and use the walker correctly/safely as she moves quickly and sometimes sets the walker aside to go through doors or narrow spaces. PT also recommended removing several area rugs that were getting the walker legs caught. Gait characterized by decreased weight shift to R, decreased R step length. STAIRS: I up/down steps to 2nd floor with cane and rail. Stairs at [facility] CG up/down 12 steps to 2nd floor with pt using single hand rail and PT carrying FWW  BALANCE: Pt scored 25/28 on Tinetti Balance Assessment placing her at low risk for falls.   Balance at [facility]: Pt scored 17/28 on Tinetti Balance Assessment placing her at high risk for falls  PATIENT RESPONE TO TX: Pt demo'd positive response to PT shown by full participation w/o increased pain and with stable vitals   PATIENT EDUCATION PROVIDED THIS VISIT: safety, HEP, walking, deep breathing        PATIENT LEVEL OF UNDERSTANDING OF EDUCATION PROVIDED: Pt verbalizes understanding of all information and demo's back as appropriate    HEP consisting of:  1) stand/walk with the SPC every waking hour   2) ice and elevate as instructed   3) daily walking program with the RW and wife   4) ther exs 2-3 times per day     supine: APs QS, SAQs, heel slides, SLR x 15   standing: (with vladislav UE support): calf raises, R/L hip abd, R/L HS curls, marching x 15   seated: LAQ, hip flex x 15   standing calf stretch x 3, hold for 3 breaths   low load long duration ext stretching from chair to chair, timed to tolerance. Written HEP issued, patient/caregiver verbalized understanding. Patient is s/p R TKR and has been treated for ROM, strengthening, gait training, stair training, HEP training, safety training, and balance training. Pt has made progress and met PT goals. ROM: 0 - 105. ROM at eval:  6 - 86.  18 degree / lag  STRENGTH:  R hip 4/5, knee 3+/5, ankle 5/5. 5 STS = 16 sec from EOB. Strength at eval: R hip 3+/5, R knee 2-/5, R ankle 4/5. L LE 5/5  WOUNDS: R knee incision is open to air with scabbing along incision line. Edges are approximated with no s/s of infection. Wound status at eval:  R TKR incision is covered with Mepilex marked change 11/28. Dressing is clean, dry, intact with a few pinprick areas of old drainage noted. No s/s of infection  BED MOBILITY: I supine<>sit and rolling L/R. Bed mobility at eval: I supine<>sit and rolling L/R  TRANSFERS: I sit<>stand from varied surfaces. I car transfer per pt and her spouse. Transfers at eval: S sig<>stand from low recliner, EOB, raised toilet. Cues provided to widen LORI and lean forward for easier transition  GAIT: Pt amb I through the house with [facility]. Amb I outside on uneven surface per LPTA.   Gait characterized by decreased knee / in stance phase. Gait at eval: Pt amb in the home up to 50' with FWW and SBA. She needs cuing to slow down and use the walker correctly/safely as she moves quickly and sometimes sets the walker aside to go through doors or narrow spaces. PT also recommended removing several area rugs that were getting the walker legs caught. Gait characterized by decreased weight shift to R, decreased R step length. STAIRS: I up/down steps to 2nd floor with cane and rail. Stairs at [facility] CG up/down 12 steps to 2nd floor with pt using single hand rail and PT carrying FWW  BALANCE: Pt scored 25/28 on Tinetti Balance Assessment placing her at low risk for falls. Balance at eval: Pt scored 17/28 on Tinetti Balance Assessment placing her at high risk for falls  Discharge plan: Discharge from [address] [place] [address] as all goals have been met. Dr Demetra Nuñez office has been notified of DC from [address] with goals met.   Pt plans to begin outpt PT this week

## 2023-11-02 ENCOUNTER — HOSPITAL ENCOUNTER (OUTPATIENT)
Facility: HOSPITAL | Age: 73
Discharge: HOME | End: 2023-11-02
Payer: MEDICARE

## 2023-11-02 DIAGNOSIS — M17.12 OSTEOARTHRITIS OF LEFT KNEE, UNSPECIFIED OSTEOARTHRITIS TYPE: ICD-10-CM

## 2023-11-02 LAB
ALBUMIN SERPL-MCNC: 3.7 G/DL (ref 3.4–5)
ALBUMIN/GLOB SERPL: 1 (ref 0.8–1.7)
ALP SERPL-CCNC: 71 U/L (ref 45–117)
ALT SERPL-CCNC: 62 U/L (ref 13–56)
ANION GAP SERPL CALC-SCNC: 6 MMOL/L (ref 3–18)
APPEARANCE UR: CLEAR
APTT PPP: 26 SEC (ref 23–36.4)
AST SERPL-CCNC: 33 U/L (ref 10–38)
BACTERIA URNS QL MICRO: ABNORMAL /HPF
BASOPHILS # BLD: 0 K/UL (ref 0–0.1)
BASOPHILS NFR BLD: 1 % (ref 0–2)
BILIRUB SERPL-MCNC: 0.5 MG/DL (ref 0.2–1)
BILIRUB UR QL: NEGATIVE
BUN SERPL-MCNC: 18 MG/DL (ref 7–18)
BUN/CREAT SERPL: 20 (ref 12–20)
CALCIUM SERPL-MCNC: 9 MG/DL (ref 8.5–10.1)
CHLORIDE SERPL-SCNC: 105 MMOL/L (ref 100–111)
CO2 SERPL-SCNC: 31 MMOL/L (ref 21–32)
COLOR UR: YELLOW
CREAT SERPL-MCNC: 0.92 MG/DL (ref 0.6–1.3)
DIFFERENTIAL METHOD BLD: ABNORMAL
EOSINOPHIL # BLD: 0.1 K/UL (ref 0–0.4)
EOSINOPHIL NFR BLD: 2 % (ref 0–5)
EPITH CASTS URNS QL MICRO: ABNORMAL /LPF (ref 0–5)
ERYTHROCYTE [DISTWIDTH] IN BLOOD BY AUTOMATED COUNT: 13.5 % (ref 11.6–14.5)
ERYTHROCYTE [SEDIMENTATION RATE] IN BLOOD: 35 MM/HR (ref 0–30)
EST. AVERAGE GLUCOSE BLD GHB EST-MCNC: 120 MG/DL
GLOBULIN SER CALC-MCNC: 3.6 G/DL (ref 2–4)
GLUCOSE SERPL-MCNC: 83 MG/DL (ref 74–99)
GLUCOSE UR STRIP.AUTO-MCNC: NEGATIVE MG/DL
HBA1C MFR BLD: 5.8 % (ref 4.2–5.6)
HCT VFR BLD AUTO: 37.3 % (ref 35–45)
HGB BLD-MCNC: 11.8 G/DL (ref 12–16)
HGB UR QL STRIP: NEGATIVE
IMM GRANULOCYTES # BLD AUTO: 0 K/UL (ref 0–0.04)
IMM GRANULOCYTES NFR BLD AUTO: 0 % (ref 0–0.5)
INR PPP: 1.1 (ref 0.9–1.1)
KETONES UR QL STRIP.AUTO: NEGATIVE MG/DL
LEUKOCYTE ESTERASE UR QL STRIP.AUTO: ABNORMAL
LYMPHOCYTES # BLD: 2.4 K/UL (ref 0.9–3.6)
LYMPHOCYTES NFR BLD: 43 % (ref 21–52)
MCH RBC QN AUTO: 29.9 PG (ref 24–34)
MCHC RBC AUTO-ENTMCNC: 31.6 G/DL (ref 31–37)
MCV RBC AUTO: 94.4 FL (ref 78–100)
MONOCYTES # BLD: 0.7 K/UL (ref 0.05–1.2)
MONOCYTES NFR BLD: 12 % (ref 3–10)
NEUTS SEG # BLD: 2.4 K/UL (ref 1.8–8)
NEUTS SEG NFR BLD: 43 % (ref 40–73)
NITRITE UR QL STRIP.AUTO: NEGATIVE
NRBC # BLD: 0 K/UL (ref 0–0.01)
NRBC BLD-RTO: 0 PER 100 WBC
PH UR STRIP: 6.5 (ref 5–8)
PLATELET # BLD AUTO: 255 K/UL (ref 135–420)
PMV BLD AUTO: 11 FL (ref 9.2–11.8)
POTASSIUM SERPL-SCNC: 3.5 MMOL/L (ref 3.5–5.5)
PROT SERPL-MCNC: 7.3 G/DL (ref 6.4–8.2)
PROT UR STRIP-MCNC: NEGATIVE MG/DL
PROTHROMBIN TIME: 13.9 SEC (ref 11.9–14.7)
RBC # BLD AUTO: 3.95 M/UL (ref 4.2–5.3)
RBC #/AREA URNS HPF: ABNORMAL /HPF (ref 0–5)
SODIUM SERPL-SCNC: 142 MMOL/L (ref 136–145)
SP GR UR REFRACTOMETRY: 1 (ref 1–1.03)
UROBILINOGEN UR QL STRIP.AUTO: 0.2 EU/DL (ref 0.2–1)
WBC # BLD AUTO: 5.7 K/UL (ref 4.6–13.2)
WBC URNS QL MICRO: ABNORMAL /HPF (ref 0–5)

## 2023-11-02 PROCEDURE — 85025 COMPLETE CBC W/AUTO DIFF WBC: CPT

## 2023-11-02 PROCEDURE — 85610 PROTHROMBIN TIME: CPT

## 2023-11-02 PROCEDURE — 93005 ELECTROCARDIOGRAM TRACING: CPT

## 2023-11-02 PROCEDURE — 81001 URINALYSIS AUTO W/SCOPE: CPT

## 2023-11-02 PROCEDURE — 36415 COLL VENOUS BLD VENIPUNCTURE: CPT

## 2023-11-02 PROCEDURE — 85730 THROMBOPLASTIN TIME PARTIAL: CPT

## 2023-11-02 PROCEDURE — 83036 HEMOGLOBIN GLYCOSYLATED A1C: CPT

## 2023-11-02 PROCEDURE — 80053 COMPREHEN METABOLIC PANEL: CPT

## 2023-11-02 PROCEDURE — 85652 RBC SED RATE AUTOMATED: CPT

## 2023-11-02 RX ORDER — HYDROCHLOROTHIAZIDE 25 MG/1
25 TABLET ORAL DAILY
COMMUNITY

## 2023-11-02 RX ORDER — METFORMIN HYDROCHLORIDE 500 MG/1
500 TABLET, EXTENDED RELEASE ORAL
COMMUNITY

## 2023-11-02 RX ORDER — BUPROPION HYDROCHLORIDE 150 MG/1
150 TABLET ORAL EVERY MORNING
COMMUNITY

## 2023-11-02 RX ORDER — RAMIPRIL 5 MG/1
CAPSULE ORAL
Status: ON HOLD | COMMUNITY
End: 2023-11-28

## 2023-11-02 RX ORDER — LORAZEPAM 0.5 MG/1
0.5 TABLET ORAL EVERY 6 HOURS PRN
Status: ON HOLD | COMMUNITY
End: 2023-11-28

## 2023-11-02 RX ORDER — ROSUVASTATIN CALCIUM 5 MG/1
5 TABLET, COATED ORAL NIGHTLY
COMMUNITY

## 2023-11-02 RX ORDER — LOSARTAN POTASSIUM 100 MG/1
TABLET ORAL
Status: ON HOLD | COMMUNITY
End: 2023-11-28

## 2023-11-02 RX ORDER — OLMESARTAN MEDOXOMIL 40 MG/1
40 TABLET ORAL DAILY
COMMUNITY

## 2023-11-03 LAB
BACTERIA SPEC CULT: NORMAL
BACTERIA SPEC CULT: NORMAL
SERVICE CMNT-IMP: NORMAL

## 2023-11-04 LAB
EKG ATRIAL RATE: 61 BPM
EKG DIAGNOSIS: NORMAL
EKG P AXIS: 61 DEGREES
EKG P-R INTERVAL: 158 MS
EKG Q-T INTERVAL: 394 MS
EKG QRS DURATION: 78 MS
EKG QTC CALCULATION (BAZETT): 396 MS
EKG R AXIS: 67 DEGREES
EKG T AXIS: 65 DEGREES
EKG VENTRICULAR RATE: 61 BPM

## 2023-11-19 PROBLEM — M17.12 PRIMARY LOCALIZED OSTEOARTHRITIS OF LEFT KNEE: Chronic | Status: ACTIVE | Noted: 2023-11-19

## 2023-11-19 RX ORDER — BUPROPION HYDROCHLORIDE 150 MG/1
150 TABLET ORAL EVERY MORNING
Status: CANCELLED | OUTPATIENT
Start: 2023-11-19

## 2023-11-19 RX ORDER — LOSARTAN POTASSIUM 50 MG/1
100 TABLET ORAL DAILY
Status: CANCELLED | OUTPATIENT
Start: 2023-11-19

## 2023-11-19 NOTE — DISCHARGE INSTRUCTIONS
[facility]   Patient Discharge Instructions    Xenia Paris / [id number] : 1950    Admitted (Not on file) Discharged: 2023     IF YOU HAVE ANY PROBLEMS ONCE YOU ARE AT [address] [facility] FOLLOWING NUMBERS:   Main office number: [phone]    Your follow up appointment to see either Dr. Dang Bass PA-C, or [facility] RUPESH as scheduled in 2 weeks. If you are unsure of your appointment date call the office at [phone]. Medication Instructions     Resume your home medictions as directed, you may have directed not to resume supplements until after your follow up. A prescription for pain medication has been given   It is important that you take the medication exactly as they are prescribed. Keep your medication in the bottles provided by the pharmacist and keep a list of the medication names, dosages, and times to be taken in your wallet. Do not take other medications without consulting your doctor. What to do at [facility] your prehospital diet. If you have excessive nausea or vomitting call your doctor's office. Be sure to maintain adequate fluid intake. Some pain medications may cause constipation. Remember to drink fluids, stay as active as possible, and eat plenty of fiber-rich foods. Begin In-Home Physical Therapy; 3 times a week to work on gait training, range of motion, strengthening, and weight bearing exercises as tolerable. Continue to use your walker or cane when walking. May progress from the walker to a cane to complete total bearing as tolerable. Patient may shower. Wrap incision with plastic wrap/covering to prevent incision from getting wet. Avoid complete immersion. YOUR DRESSING SHOULD BE CHANGED BY YOUR HOME HEALTH NURSE 5-7 AFTER SURGERY ACCORDING TO THE DATE WRITTEN ON YOUR DRESSING.       When to Call    - Call if you have a temperature greater then 101  - Unable to keep food down  - Are

## 2023-11-19 NOTE — H&P
been discussed with the patient and family. After consideration of risks, benefits, and other options for treatment, the patient has consented to surgical interventions. Questions were answered and preoperative teaching was done by Dr Vivi Jules.      Signed By: SONALI Solomon     November 19, 2023

## 2023-11-28 ENCOUNTER — ANESTHESIA EVENT (OUTPATIENT)
Facility: HOSPITAL | Age: 73
End: 2023-11-28
Payer: MEDICARE

## 2023-11-28 ENCOUNTER — HOSPITAL ENCOUNTER (OUTPATIENT)
Facility: HOSPITAL | Age: 73
Discharge: HOME | End: 2023-11-28
Attending: ORTHOPAEDIC SURGERY | Admitting: ORTHOPAEDIC SURGERY
Payer: MEDICARE

## 2023-11-28 ENCOUNTER — APPOINTMENT (OUTPATIENT)
Facility: HOSPITAL | Age: 73
End: 2023-11-28
Attending: ORTHOPAEDIC SURGERY
Payer: MEDICARE

## 2023-11-28 ENCOUNTER — ANESTHESIA (OUTPATIENT)
Facility: HOSPITAL | Age: 73
End: 2023-11-28
Payer: MEDICARE

## 2023-11-28 VITALS
BODY MASS INDEX: 34.32 KG/M2 | RESPIRATION RATE: 20 BRPM | SYSTOLIC BLOOD PRESSURE: 129 MMHG | WEIGHT: 231.7 LBS | DIASTOLIC BLOOD PRESSURE: 61 MMHG | HEART RATE: 88 BPM | OXYGEN SATURATION: 98 % | HEIGHT: 69 IN | TEMPERATURE: 98.1 F

## 2023-11-28 DIAGNOSIS — M17.12 PRIMARY LOCALIZED OSTEOARTHRITIS OF LEFT KNEE: Primary | Chronic | ICD-10-CM

## 2023-11-28 LAB
ABO + RH BLD: NORMAL
BLOOD GROUP ANTIBODIES SERPL: NORMAL
GLUCOSE BLD STRIP.AUTO-MCNC: 108 MG/DL (ref 70–110)
GLUCOSE BLD STRIP.AUTO-MCNC: 127 MG/DL (ref 70–110)
SPECIMEN EXP DATE BLD: NORMAL

## 2023-11-28 PROCEDURE — 6370000000 HC RX 637 (ALT 250 FOR IP): Performed by: ANESTHESIOLOGY

## 2023-11-28 PROCEDURE — 7100000010 HC PHASE II RECOVERY - FIRST 15 MIN: Performed by: ORTHOPAEDIC SURGERY

## 2023-11-28 PROCEDURE — 7100000011 HC PHASE II RECOVERY - ADDTL 15 MIN: Performed by: ORTHOPAEDIC SURGERY

## 2023-11-28 PROCEDURE — 97161 PT EVAL LOW COMPLEX 20 MIN: CPT

## 2023-11-28 PROCEDURE — 3600000005 HC SURGERY LEVEL 5 BASE: Performed by: ORTHOPAEDIC SURGERY

## 2023-11-28 PROCEDURE — 97116 GAIT TRAINING THERAPY: CPT

## 2023-11-28 PROCEDURE — 6360000002 HC RX W HCPCS: Performed by: PHYSICIAN ASSISTANT

## 2023-11-28 PROCEDURE — 93005 ELECTROCARDIOGRAM TRACING: CPT | Performed by: ANESTHESIOLOGY

## 2023-11-28 PROCEDURE — 6360000002 HC RX W HCPCS: Performed by: ANESTHESIOLOGY

## 2023-11-28 PROCEDURE — 2580000003 HC RX 258: Performed by: ORTHOPAEDIC SURGERY

## 2023-11-28 PROCEDURE — 6370000000 HC RX 637 (ALT 250 FOR IP): Performed by: PHYSICIAN ASSISTANT

## 2023-11-28 PROCEDURE — 2580000003 HC RX 258: Performed by: PHYSICIAN ASSISTANT

## 2023-11-28 PROCEDURE — A4217 STERILE WATER/SALINE, 500 ML: HCPCS | Performed by: ORTHOPAEDIC SURGERY

## 2023-11-28 PROCEDURE — 86850 RBC ANTIBODY SCREEN: CPT

## 2023-11-28 PROCEDURE — 3700000000 HC ANESTHESIA ATTENDED CARE: Performed by: ORTHOPAEDIC SURGERY

## 2023-11-28 PROCEDURE — 73560 X-RAY EXAM OF KNEE 1 OR 2: CPT

## 2023-11-28 PROCEDURE — 6360000002 HC RX W HCPCS: Performed by: ORTHOPAEDIC SURGERY

## 2023-11-28 PROCEDURE — 7100000001 HC PACU RECOVERY - ADDTL 15 MIN: Performed by: ORTHOPAEDIC SURGERY

## 2023-11-28 PROCEDURE — 36415 COLL VENOUS BLD VENIPUNCTURE: CPT

## 2023-11-28 PROCEDURE — C1776 JOINT DEVICE (IMPLANTABLE): HCPCS | Performed by: ORTHOPAEDIC SURGERY

## 2023-11-28 PROCEDURE — 86901 BLOOD TYPING SEROLOGIC RH(D): CPT

## 2023-11-28 PROCEDURE — 7100000000 HC PACU RECOVERY - FIRST 15 MIN: Performed by: ORTHOPAEDIC SURGERY

## 2023-11-28 PROCEDURE — 2500000003 HC RX 250 WO HCPCS: Performed by: ORTHOPAEDIC SURGERY

## 2023-11-28 PROCEDURE — 64447 NJX AA&/STRD FEMORAL NRV IMG: CPT | Performed by: ANESTHESIOLOGY

## 2023-11-28 PROCEDURE — 3700000001 HC ADD 15 MINUTES (ANESTHESIA): Performed by: ORTHOPAEDIC SURGERY

## 2023-11-28 PROCEDURE — C1713 ANCHOR/SCREW BN/BN,TIS/BN: HCPCS | Performed by: ORTHOPAEDIC SURGERY

## 2023-11-28 PROCEDURE — 86900 BLOOD TYPING SEROLOGIC ABO: CPT

## 2023-11-28 PROCEDURE — 2500000003 HC RX 250 WO HCPCS: Performed by: NURSE ANESTHETIST, CERTIFIED REGISTERED

## 2023-11-28 PROCEDURE — 6360000002 HC RX W HCPCS: Performed by: NURSE ANESTHETIST, CERTIFIED REGISTERED

## 2023-11-28 PROCEDURE — 82962 GLUCOSE BLOOD TEST: CPT

## 2023-11-28 PROCEDURE — 2500000003 HC RX 250 WO HCPCS: Performed by: ANESTHESIOLOGY

## 2023-11-28 PROCEDURE — 3600000015 HC SURGERY LEVEL 5 ADDTL 15MIN: Performed by: ORTHOPAEDIC SURGERY

## 2023-11-28 PROCEDURE — 2709999900 HC NON-CHARGEABLE SUPPLY: Performed by: ORTHOPAEDIC SURGERY

## 2023-11-28 DEVICE — SIZE 4 9MM TIBIAL INSERT CR
Type: IMPLANTABLE DEVICE | Site: KNEE | Status: FUNCTIONAL
Brand: BKS E-VITALIZE

## 2023-11-28 DEVICE — LT SIZE 4 FEMORAL CR NONPOROUS
Type: IMPLANTABLE DEVICE | Site: KNEE | Status: FUNCTIONAL
Brand: BKS TRIMAX

## 2023-11-28 DEVICE — 35MM PATELLA, VITAMIN E
Type: IMPLANTABLE DEVICE | Site: KNEE | Status: FUNCTIONAL
Brand: BKS E-VITALIZE

## 2023-11-28 DEVICE — SIZE 4 TIBIAL TRAY NONPOROUS
Type: IMPLANTABLE DEVICE | Site: KNEE | Status: FUNCTIONAL
Brand: BALANCED KNEE SYSTEM

## 2023-11-28 DEVICE — CEMENT BONE 40GM HI VISC PALACOS R: Type: IMPLANTABLE DEVICE | Site: KNEE | Status: FUNCTIONAL

## 2023-11-28 RX ORDER — LIDOCAINE HYDROCHLORIDE 20 MG/ML
INJECTION, SOLUTION EPIDURAL; INFILTRATION; INTRACAUDAL; PERINEURAL PRN
Status: DISCONTINUED | OUTPATIENT
Start: 2023-11-28 | End: 2023-11-28 | Stop reason: SDUPTHER

## 2023-11-28 RX ORDER — OXYCODONE HYDROCHLORIDE 5 MG/1
5 TABLET ORAL EVERY 4 HOURS PRN
Qty: 42 TABLET | Refills: 0 | Status: SHIPPED | OUTPATIENT
Start: 2023-11-28 | End: 2023-12-05

## 2023-11-28 RX ORDER — FENTANYL CITRATE 50 UG/ML
25 INJECTION, SOLUTION INTRAMUSCULAR; INTRAVENOUS EVERY 5 MIN PRN
Status: DISCONTINUED | OUTPATIENT
Start: 2023-11-28 | End: 2023-11-28 | Stop reason: HOSPADM

## 2023-11-28 RX ORDER — CHLORHEXIDINE GLUCONATE 4 G/100ML
SOLUTION TOPICAL ONCE
Status: DISCONTINUED | OUTPATIENT
Start: 2023-11-28 | End: 2023-11-28 | Stop reason: HOSPADM

## 2023-11-28 RX ORDER — ASPIRIN 81 MG/1
81 TABLET ORAL 2 TIMES DAILY
Qty: 42 TABLET | Refills: 0 | Status: SHIPPED | OUTPATIENT
Start: 2023-11-28 | End: 2023-12-19

## 2023-11-28 RX ORDER — ACETAMINOPHEN 500 MG
1000 TABLET ORAL ONCE
Status: COMPLETED | OUTPATIENT
Start: 2023-11-28 | End: 2023-11-28

## 2023-11-28 RX ORDER — SODIUM CHLORIDE 9 MG/ML
INJECTION, SOLUTION INTRAVENOUS CONTINUOUS
Status: DISCONTINUED | OUTPATIENT
Start: 2023-11-28 | End: 2023-11-28 | Stop reason: HOSPADM

## 2023-11-28 RX ORDER — KETOROLAC TROMETHAMINE 15 MG/ML
15 INJECTION, SOLUTION INTRAMUSCULAR; INTRAVENOUS EVERY 6 HOURS
Status: DISCONTINUED | OUTPATIENT
Start: 2023-11-28 | End: 2023-11-28 | Stop reason: HOSPADM

## 2023-11-28 RX ORDER — LABETALOL HYDROCHLORIDE 5 MG/ML
10 INJECTION, SOLUTION INTRAVENOUS
Status: DISCONTINUED | OUTPATIENT
Start: 2023-11-28 | End: 2023-11-28 | Stop reason: HOSPADM

## 2023-11-28 RX ORDER — DIPHENHYDRAMINE HYDROCHLORIDE 50 MG/ML
25 INJECTION INTRAMUSCULAR; INTRAVENOUS EVERY 6 HOURS PRN
Status: DISCONTINUED | OUTPATIENT
Start: 2023-11-28 | End: 2023-11-28 | Stop reason: HOSPADM

## 2023-11-28 RX ORDER — MIDAZOLAM HYDROCHLORIDE 1 MG/ML
INJECTION INTRAMUSCULAR; INTRAVENOUS PRN
Status: DISCONTINUED | OUTPATIENT
Start: 2023-11-28 | End: 2023-11-28 | Stop reason: SDUPTHER

## 2023-11-28 RX ORDER — SODIUM CHLORIDE 0.9 % (FLUSH) 0.9 %
5-40 SYRINGE (ML) INJECTION PRN
Status: DISCONTINUED | OUTPATIENT
Start: 2023-11-28 | End: 2023-11-28 | Stop reason: HOSPADM

## 2023-11-28 RX ORDER — PROPOFOL 10 MG/ML
INJECTION, EMULSION INTRAVENOUS CONTINUOUS PRN
Status: DISCONTINUED | OUTPATIENT
Start: 2023-11-28 | End: 2023-11-28 | Stop reason: SDUPTHER

## 2023-11-28 RX ORDER — DEXAMETHASONE SODIUM PHOSPHATE 10 MG/ML
INJECTION, SOLUTION INTRAMUSCULAR; INTRAVENOUS
Status: COMPLETED
Start: 2023-11-28 | End: 2023-11-28

## 2023-11-28 RX ORDER — MIDAZOLAM HYDROCHLORIDE 1 MG/ML
INJECTION INTRAMUSCULAR; INTRAVENOUS
Status: COMPLETED
Start: 2023-11-28 | End: 2023-11-28

## 2023-11-28 RX ORDER — ROPIVACAINE HYDROCHLORIDE 5 MG/ML
INJECTION, SOLUTION EPIDURAL; INFILTRATION; PERINEURAL
Status: COMPLETED | OUTPATIENT
Start: 2023-11-28 | End: 2023-11-28

## 2023-11-28 RX ORDER — OXYCODONE HYDROCHLORIDE 5 MG/1
10 TABLET ORAL EVERY 4 HOURS PRN
Status: DISCONTINUED | OUTPATIENT
Start: 2023-11-28 | End: 2023-11-28 | Stop reason: HOSPADM

## 2023-11-28 RX ORDER — DIPHENHYDRAMINE HYDROCHLORIDE 50 MG/ML
12.5 INJECTION INTRAMUSCULAR; INTRAVENOUS
Status: DISCONTINUED | OUTPATIENT
Start: 2023-11-28 | End: 2023-11-28 | Stop reason: HOSPADM

## 2023-11-28 RX ORDER — ONDANSETRON 2 MG/ML
4 INJECTION INTRAMUSCULAR; INTRAVENOUS EVERY 6 HOURS PRN
Status: DISCONTINUED | OUTPATIENT
Start: 2023-11-28 | End: 2023-11-28 | Stop reason: HOSPADM

## 2023-11-28 RX ORDER — OXYCODONE HYDROCHLORIDE 5 MG/1
5 TABLET ORAL EVERY 4 HOURS PRN
Status: DISCONTINUED | OUTPATIENT
Start: 2023-11-28 | End: 2023-11-28 | Stop reason: HOSPADM

## 2023-11-28 RX ORDER — DEXMEDETOMIDINE HYDROCHLORIDE 100 UG/ML
INJECTION, SOLUTION INTRAVENOUS
Status: COMPLETED | OUTPATIENT
Start: 2023-11-28 | End: 2023-11-28

## 2023-11-28 RX ORDER — SODIUM CHLORIDE, SODIUM LACTATE, POTASSIUM CHLORIDE, AND CALCIUM CHLORIDE .6; .31; .03; .02 G/100ML; G/100ML; G/100ML; G/100ML
1000 INJECTION, SOLUTION INTRAVENOUS ONCE
Status: COMPLETED | OUTPATIENT
Start: 2023-11-28 | End: 2023-11-28

## 2023-11-28 RX ORDER — DIPHENHYDRAMINE HCL 25 MG
25 CAPSULE ORAL EVERY 6 HOURS PRN
Status: DISCONTINUED | OUTPATIENT
Start: 2023-11-28 | End: 2023-11-28 | Stop reason: HOSPADM

## 2023-11-28 RX ORDER — CEFADROXIL 500 MG/1
500 CAPSULE ORAL 2 TIMES DAILY
Qty: 10 CAPSULE | Refills: 0 | Status: SHIPPED | OUTPATIENT
Start: 2023-11-28 | End: 2023-12-03

## 2023-11-28 RX ORDER — PROCHLORPERAZINE EDISYLATE 5 MG/ML
5 INJECTION INTRAMUSCULAR; INTRAVENOUS
Status: DISCONTINUED | OUTPATIENT
Start: 2023-11-28 | End: 2023-11-28 | Stop reason: HOSPADM

## 2023-11-28 RX ORDER — TRANEXAMIC ACID 650 MG/1
1950 TABLET ORAL ONCE
Status: COMPLETED | OUTPATIENT
Start: 2023-11-28 | End: 2023-11-28

## 2023-11-28 RX ORDER — IPRATROPIUM BROMIDE AND ALBUTEROL SULFATE 2.5; .5 MG/3ML; MG/3ML
1 SOLUTION RESPIRATORY (INHALATION)
Status: DISCONTINUED | OUTPATIENT
Start: 2023-11-28 | End: 2023-11-28 | Stop reason: HOSPADM

## 2023-11-28 RX ORDER — PANTOPRAZOLE SODIUM 40 MG/1
40 TABLET, DELAYED RELEASE ORAL ONCE
Status: COMPLETED | OUTPATIENT
Start: 2023-11-28 | End: 2023-11-28

## 2023-11-28 RX ORDER — DEXAMETHASONE SODIUM PHOSPHATE 4 MG/ML
8 INJECTION, SOLUTION INTRA-ARTICULAR; INTRALESIONAL; INTRAMUSCULAR; INTRAVENOUS; SOFT TISSUE ONCE
Status: COMPLETED | OUTPATIENT
Start: 2023-11-28 | End: 2023-11-28

## 2023-11-28 RX ORDER — DEXAMETHASONE SODIUM PHOSPHATE 10 MG/ML
INJECTION, SOLUTION INTRAMUSCULAR; INTRAVENOUS
Status: COMPLETED | OUTPATIENT
Start: 2023-11-28 | End: 2023-11-28

## 2023-11-28 RX ORDER — ACETAMINOPHEN 325 MG/1
650 TABLET ORAL EVERY 6 HOURS
Status: DISCONTINUED | OUTPATIENT
Start: 2023-11-28 | End: 2023-11-28 | Stop reason: HOSPADM

## 2023-11-28 RX ORDER — EPHEDRINE SULFATE/0.9% NACL/PF 50 MG/5 ML
SYRINGE (ML) INTRAVENOUS PRN
Status: DISCONTINUED | OUTPATIENT
Start: 2023-11-28 | End: 2023-11-28 | Stop reason: SDUPTHER

## 2023-11-28 RX ORDER — SODIUM CHLORIDE 0.9 % (FLUSH) 0.9 %
5-40 SYRINGE (ML) INJECTION EVERY 12 HOURS SCHEDULED
Status: DISCONTINUED | OUTPATIENT
Start: 2023-11-28 | End: 2023-11-28 | Stop reason: HOSPADM

## 2023-11-28 RX ORDER — SODIUM CHLORIDE 9 MG/ML
INJECTION, SOLUTION INTRAVENOUS PRN
Status: DISCONTINUED | OUTPATIENT
Start: 2023-11-28 | End: 2023-11-28 | Stop reason: HOSPADM

## 2023-11-28 RX ORDER — OXYCODONE HYDROCHLORIDE 5 MG/1
5 TABLET ORAL
Status: COMPLETED | OUTPATIENT
Start: 2023-11-28 | End: 2023-11-28

## 2023-11-28 RX ORDER — ONDANSETRON 2 MG/ML
4 INJECTION INTRAMUSCULAR; INTRAVENOUS
Status: DISCONTINUED | OUTPATIENT
Start: 2023-11-28 | End: 2023-11-28 | Stop reason: HOSPADM

## 2023-11-28 RX ORDER — DEXAMETHASONE SODIUM PHOSPHATE 4 MG/ML
4 INJECTION, SOLUTION INTRA-ARTICULAR; INTRALESIONAL; INTRAMUSCULAR; INTRAVENOUS; SOFT TISSUE ONCE
Status: COMPLETED | OUTPATIENT
Start: 2023-11-28 | End: 2023-11-28

## 2023-11-28 RX ORDER — HYDROMORPHONE HYDROCHLORIDE 1 MG/ML
0.5 INJECTION, SOLUTION INTRAMUSCULAR; INTRAVENOUS; SUBCUTANEOUS EVERY 5 MIN PRN
Status: DISCONTINUED | OUTPATIENT
Start: 2023-11-28 | End: 2023-11-28 | Stop reason: HOSPADM

## 2023-11-28 RX ORDER — MELOXICAM 7.5 MG/1
7.5 TABLET ORAL 2 TIMES DAILY
Qty: 28 TABLET | Refills: 0 | Status: SHIPPED | OUTPATIENT
Start: 2023-11-28 | End: 2023-12-12

## 2023-11-28 RX ORDER — SODIUM CHLORIDE, SODIUM LACTATE, POTASSIUM CHLORIDE, CALCIUM CHLORIDE 600; 310; 30; 20 MG/100ML; MG/100ML; MG/100ML; MG/100ML
INJECTION, SOLUTION INTRAVENOUS CONTINUOUS
Status: DISCONTINUED | OUTPATIENT
Start: 2023-11-28 | End: 2023-11-28 | Stop reason: HOSPADM

## 2023-11-28 RX ORDER — PHENYLEPHRINE HCL IN 0.9% NACL 1 MG/10 ML
SYRINGE (ML) INTRAVENOUS PRN
Status: DISCONTINUED | OUTPATIENT
Start: 2023-11-28 | End: 2023-11-28 | Stop reason: SDUPTHER

## 2023-11-28 RX ADMIN — Medication 100 MCG: at 10:34

## 2023-11-28 RX ADMIN — PROPOFOL 75 MCG/KG/MIN: 10 INJECTION, EMULSION INTRAVENOUS at 09:53

## 2023-11-28 RX ADMIN — Medication 5 MG: at 10:15

## 2023-11-28 RX ADMIN — OXYCODONE 5 MG: 5 TABLET ORAL at 12:56

## 2023-11-28 RX ADMIN — ACETAMINOPHEN 1000 MG: 500 TABLET ORAL at 07:14

## 2023-11-28 RX ADMIN — DEXAMETHASONE SODIUM PHOSPHATE 4 MG: 4 INJECTION INTRA-ARTICULAR; INTRALESIONAL; INTRAMUSCULAR; INTRAVENOUS; SOFT TISSUE at 07:29

## 2023-11-28 RX ADMIN — PANTOPRAZOLE SODIUM 40 MG: 40 TABLET, DELAYED RELEASE ORAL at 07:14

## 2023-11-28 RX ADMIN — ACETAMINOPHEN 650 MG: 325 TABLET ORAL at 12:55

## 2023-11-28 RX ADMIN — Medication 100 MCG: at 10:58

## 2023-11-28 RX ADMIN — Medication 100 MCG: at 11:09

## 2023-11-28 RX ADMIN — SODIUM CHLORIDE, POTASSIUM CHLORIDE, SODIUM LACTATE AND CALCIUM CHLORIDE: 600; 310; 30; 20 INJECTION, SOLUTION INTRAVENOUS at 07:29

## 2023-11-28 RX ADMIN — WATER 2000 MG: 1 INJECTION INTRAMUSCULAR; INTRAVENOUS; SUBCUTANEOUS at 09:52

## 2023-11-28 RX ADMIN — TRANEXAMIC ACID 1950 MG: 650 TABLET ORAL at 07:14

## 2023-11-28 RX ADMIN — MIDAZOLAM 2 MG: 1 INJECTION INTRAMUSCULAR; INTRAVENOUS at 09:14

## 2023-11-28 RX ADMIN — DEXMEDETOMIDINE HYDROCHLORIDE 0.2 ML: 100 INJECTION, SOLUTION INTRAVENOUS at 09:18

## 2023-11-28 RX ADMIN — Medication 10 MG: at 10:17

## 2023-11-28 RX ADMIN — Medication 10 MG: at 10:27

## 2023-11-28 RX ADMIN — DEXAMETHASONE SODIUM PHOSPHATE 4 MG: 10 INJECTION, SOLUTION INTRAMUSCULAR; INTRAVENOUS at 09:18

## 2023-11-28 RX ADMIN — LIDOCAINE HYDROCHLORIDE 40 MG: 20 INJECTION, SOLUTION EPIDURAL; INFILTRATION; INTRACAUDAL; PERINEURAL at 09:53

## 2023-11-28 RX ADMIN — DEXAMETHASONE SODIUM PHOSPHATE 8 MG: 4 INJECTION INTRA-ARTICULAR; INTRALESIONAL; INTRAMUSCULAR; INTRAVENOUS; SOFT TISSUE at 11:37

## 2023-11-28 RX ADMIN — Medication 100 MCG: at 10:42

## 2023-11-28 RX ADMIN — MEPIVACAINE HYDROCHLORIDE 45 MG: 15 INJECTION, SOLUTION EPIDURAL; INFILTRATION at 09:44

## 2023-11-28 RX ADMIN — ROPIVACAINE HYDROCHLORIDE 25 ML: 5 INJECTION EPIDURAL; INFILTRATION; PERINEURAL at 09:18

## 2023-11-28 RX ADMIN — SODIUM CHLORIDE, POTASSIUM CHLORIDE, SODIUM LACTATE AND CALCIUM CHLORIDE 1000 ML: 600; 310; 30; 20 INJECTION, SOLUTION INTRAVENOUS at 07:29

## 2023-11-28 ASSESSMENT — PAIN DESCRIPTION - ORIENTATION: ORIENTATION: LEFT

## 2023-11-28 ASSESSMENT — PAIN - FUNCTIONAL ASSESSMENT: PAIN_FUNCTIONAL_ASSESSMENT: 0-10

## 2023-11-28 ASSESSMENT — PAIN DESCRIPTION - LOCATION: LOCATION: KNEE

## 2023-11-28 ASSESSMENT — PAIN DESCRIPTION - PAIN TYPE: TYPE: SURGICAL PAIN

## 2023-11-28 ASSESSMENT — PAIN SCALES - GENERAL
PAINLEVEL_OUTOF10: 6
PAINLEVEL_OUTOF10: 8

## 2023-11-28 ASSESSMENT — PAIN DESCRIPTION - FREQUENCY: FREQUENCY: INTERMITTENT

## 2023-11-28 ASSESSMENT — PAIN DESCRIPTION - DESCRIPTORS: DESCRIPTORS: ACHING

## 2023-11-28 NOTE — OP NOTE
anesthetized with anesthesia. Intravenous antibiotics were administered. Prior to the incision being made a timeout was called identifying the patient, procedure, operative side, and surgeon. A pneumatic tourniquet was placed about the limb and the left leg was prepped and draped in the usual sterile manner. The tourniquet was not inflated throughout the case. A midline anterior incision made over the knee. The incision was carried down through the subcutaneous tissue to the underlying capsule. A medial parapatellar capsular incision was performed. The medial capsular flap was carefully elevated around to the posterior medial corner protecting the medial collateral ligaments and the fibers. The patella was sized with a caliper, and approximately 10-12 mm was resected with an oscillating saw allowing the patella to be slid into the lateral gutter. It was not everted throughout the case. Our attention was first turned to the distal femur and using intermedullary instrumentation, a 5-degree valgus cut was on the distal end of the femur. The distal end of the femur was sized to a size 4 femoral component. Pins were inserted through the sizer and the corresponding 4-in-1 block was slid into place and pinned for stability. Anterior and posterior and chamfer cuts were made to accommodate the femoral component. The medial and lateral menisci were excised as were the anterior cruciate ligaments. Our attention was then turned to the tibia. Using extramedullary instrumentation, a 3-degree cut was made on the proximal end of the tibia. A spacer block was placed to show gaps had been balanced and a size 4  tibial base plate was placed on the tibia and pinned into place. Intramedullary reaming guide was placed on the tibia and the appropriate reamer was used followed by the keel punch to complete the preparation of the tibia. A trial femoral component was then impacted on to the distal end of the femur.  Trial reduction

## 2023-11-28 NOTE — INTERVAL H&P NOTE
Update History & Physical    The patient's History and Physical of November 19, 2023 was reviewed with the patient and I examined the patient. There was no change. The surgical site was confirmed by the patient and me. Plan: The risks, benefits, expected outcome, and alternative to the recommended procedure have been discussed with the patient. Patient understands and wants to proceed with the procedure.      Electronically signed by Makeda Harris MD on 11/28/2023 at 7:00 AM

## 2023-11-28 NOTE — ANESTHESIA POSTPROCEDURE EVALUATION
Department of Anesthesiology  Postprocedure Note    Patient: Pamela Guerra  MRN: [id number]  YOB: 1950  Date of evaluation: 11/28/2023      Procedure Summary     Date: 11/28/23 Room / Location: [facility] 09 / [facility] OR    Anesthesia Start: 0940 Anesthesia Stop: 6687    Procedure: LEFT TOTAL KNEE REPLACEMENT (Left: Knee) Diagnosis:       Primary osteoarthritis of left knee      (Primary osteoarthritis of left knee [M17.12])    Surgeons: Makeda Harris MD Responsible Provider: Francheska Fabian DO    Anesthesia Type: Spinal, MAC ASA Status: 2          Anesthesia Type: Spinal, MAC    Rafael Phase I: Rafael Score: 10    Rafael Phase II: Rafael Score: 10      Anesthesia Post Evaluation    Patient location during evaluation: PACU  Patient participation: complete - patient participated  Level of consciousness: awake and alert  Pain score: 0  Airway patency: patent  Nausea & Vomiting: no nausea and no vomiting  Complications: no  Cardiovascular status: blood pressure returned to baseline  Respiratory status: acceptable  Hydration status: euvolemic  Comments: Intraoperatively, pt had what appeared to be a RBBB vs IVCD. hemodynamically stable. Post op EKG with new RBBB. Denies any CP. OK to discharge.   Multimodal analgesia pain management approach  Pain management: adequate

## 2023-11-28 NOTE — ANESTHESIA PRE PROCEDURE
Department of Anesthesiology  Preprocedure Note       Name:  Cata Garcia   Age:  72 y.o.  :  1950                                          MRN:  [id number]         Date:  2023      Surgeon: Clayton Amaro):  Charu Briscoe MD    Procedure: Procedure(s):  LEFT TOTAL KNEE REPLACEMENT    Medications prior to admission:   Prior to Admission medications    Medication Sig Start Date End Date Taking? Authorizing Provider   aspirin (ASPIRIN LOW DOSE) 81 MG EC tablet Take 1 tablet by mouth 2 times daily for 21 days 23 Yes Jojo Richardson PA   cefadroxil (DURICEF) 500 MG capsule Take 1 capsule by mouth 2 times daily for 5 days 11/28/23 12/3/23 Yes Tyron Richardson PA   meloxicam (MOBIC) 7.5 MG tablet Take 1 tablet by mouth 2 times daily for 14 days 23 Yes Tyron Richardson PA   oxyCODONE (ROXICODONE) 5 MG immediate release tablet Take 1 tablet by mouth every 4 hours as needed for Pain (supervising physician: Dr. Arina Cohen) for up to 7 days. Take lowest dose possible to manage pain.  Supervising physician: Dr. Arina Cohen Max Daily Amount: 30 mg 23 Yes Tyron Richardson PA   buPROPion (WELLBUTRIN XL) 150 MG extended release tablet Take 1 tablet by mouth every morning   Yes Leora Guo MD   hydroCHLOROthiazide (HYDRODIURIL) 25 MG tablet Take 1 tablet by mouth daily   Yes Leora Guo MD   metFORMIN (GLUCOPHAGE-XR) 500 MG extended release tablet Take 1 tablet by mouth daily (with breakfast) Indications: Type II DM   Yes Leora Guo MD   olmesartan (BENICAR) 40 MG tablet Take 1 tablet by mouth daily Indications: HTN   Yes Leora Guo MD   rosuvastatin (CRESTOR) 5 MG tablet Take 1 tablet by mouth nightly Indications: high cholesterol   Yes Leora Guo MD   vitamin D (CHOLECALCIFEROL) 25 MCG (1000 UT) TABS tablet Take 1 tablet by mouth daily    Leora Guo MD   zinc sulfate (ZINCATE) 220 (50 Zn) MG capsule Take 1

## 2023-11-28 NOTE — PERIOP NOTE
TRANSFER - IN REPORT:    Verbal report received from [facility] on Dai Campo  being received from PACU for routine post-op      Report consisted of patient's Situation, Background, Assessment and   Recommendations(SBAR). Information from the following report(s) Nurse Handoff Report, Surgery Report, Intake/Output, and MAR was reviewed with the receiving nurse. Opportunity for questions and clarification was provided. Assessment completed upon patient's arrival to unit and care assumed.

## 2023-11-28 NOTE — ANESTHESIA PROCEDURE NOTES
Spinal Block    End time: 11/28/2023 9:49 AM  Reason for block: primary anesthetic  Staffing  Performed: other anesthesia staff   Anesthesiologist: Kimberly Acuna DO  Resident/CRNA: TONYA Carrillo CRNA  Other anesthesia staff: Carlos Estrada RN  Performed by: TONYA Carrillo CRNA  Authorized by:  Kimberly Acuna DO    Spinal Block  Patient position: sitting  Prep: Betadine  Patient monitoring: frequent blood pressure checks  Approach: midline  Location: L4/L5  Guidance: paresthesia technique  Provider prep: mask and sterile gloves  Local infiltration: lidocaine  Needle  Needle type: Pencan   Needle gauge: 25 G  Needle length: 3.5 in  Assessment  Swirl obtained: Yes  CSF: clear  Attempts: 1  Hemodynamics: stable  Preanesthetic Checklist  Completed: patient identified, IV checked, site marked, risks and benefits discussed, surgical/procedural consents, equipment checked, pre-op evaluation, timeout performed, anesthesia consent given, oxygen available, monitors applied/VS acknowledged, fire risk safety assessment completed and verbalized and blood product R/B/A discussed and consented

## 2023-11-28 NOTE — ANESTHESIA PROCEDURE NOTES
Peripheral Block    Patient location during procedure: pre-op  Reason for block: post-op pain management and at surgeon's request  Start time: 11/28/2023 9:14 AM  End time: 11/28/2023 9:18 AM  Staffing  Performed: anesthesiologist   Anesthesiologist: Campos Cantrell DO  Performed by: Campos Cantrell DO  Authorized by: Campos Cantrell DO    Preanesthetic Checklist  Completed: patient identified, IV checked, site marked, risks and benefits discussed, surgical/procedural consents, equipment checked, pre-op evaluation, timeout performed, anesthesia consent given, oxygen available, monitors applied/VS acknowledged, fire risk safety assessment completed and verbalized and blood product R/B/A discussed and consented  Peripheral Block   Patient position: supine  Prep: ChloraPrep  Provider prep: mask and sterile gloves  Patient monitoring: cardiac monitor, continuous pulse ox, frequent blood pressure checks, IV access, oxygen and responsive to questions  Block type: Femoral  Adductor canal  Laterality: left  Injection technique: single-shot  Guidance: nerve stimulator and ultrasound guided    Needle   Needle type: insulated echogenic nerve stimulator needle   Needle gauge: 20 G  Needle localization: nerve stimulator and ultrasound guidance  Needle length: 10 cm  Assessment   Injection assessment: negative aspiration for heme, no paresthesia on injection, local visualized surrounding nerve on ultrasound and no intravascular symptoms  Paresthesia pain: none  Slow fractionated injection: yes  Hemodynamics: stable  Real-time US image taken/store: yes  Outcomes: uncomplicated and patient tolerated procedure well    Additional Notes  10 Ml injected near NVM using nerve stim.    Medications Administered  dexamethasone (DECADRON) (PF) 10 mg/mL injection - Other   4 mg - 11/28/2023 9:18:00 AM  dexmedetomidine (PRECEDEX) injection 200 mcg/2 mL - Perineural   0.2 mL - 11/28/2023 9:18:00 AM  ropivacaine (NAROPIN) injection 0.5% -

## 2023-11-28 NOTE — DISCHARGE SUMMARY
[address] [address]     DISCHARGE SUMMARY     PATIENT: Louise Francisco     MRN: [id number]   ADMIT DATE: 2023   [facility]   DISCHARGE DATE: 2023     ATTENDING: Kat Thompson MD   DICTATING: SONALI Skinner         ADMISSION DIAGNOSIS: Primary osteoarthritis of left knee [M17.12]  Primary localized osteoarthritis of left knee [M17.12]    DISCHARGE DIAGNOSIS: Status post LEFT TOTAL KNEE ARTHROPLASTY    HISTORY OF PRESENT ILLNESS: The patient is a 72y.o. year-old female   with ongoing left knee pain secondary to osteoarthritis of her left knee. The patient's pain has persisted and progressed despite conservative treatments and therapies. The patient has at this time opted for surgical intervention. PAST MEDICAL HISTORY:   Past Medical History:   Diagnosis Date    Anxiety     Bupropion & lorazepam    Arthritis 2019    OA in both knees. Orthopedic Dr. Nirali Castro    Chronic pain     left knee    Diabetes mellitus ([address])     Metformin daily-Type II DM    Hearing loss     high frequency  hearing loss  no hearing aids    Hx of idiopathic thrombocytopenic purpura     remission since . last Nplate infusion 4914.  Annual visit. managed by [facility] oncology in [address] [facility] [address]. Aurelio Geer    Hx of squamous cell carcinoma     removed from face, chest    Hyperlipidemia     Rosuvastatin    Hypertension     PCP Dr Valeriano Reynolds    PACO on CPAP     managed by [facility] dos    Primary localized osteoarthritis of left knee 2023    Wears prescription eyeglasses        PAST SURGICAL HISTORY:   Past Surgical History:   Procedure Laterality Date    COLONOSCOPY  2023    LAPAROSCOPIC APPENDECTOMY  2022    Dr. Odilia Jose Right 2022    Dr. Hayley Mesa:   Allergies   Allergen Reactions    Ace Inhibitors Cough        CURRENT MEDICATIONS:  A list of medications prior

## 2023-11-29 LAB
EKG ATRIAL RATE: 74 BPM
EKG DIAGNOSIS: NORMAL
EKG P AXIS: 86 DEGREES
EKG P-R INTERVAL: 174 MS
EKG Q-T INTERVAL: 408 MS
EKG QRS DURATION: 132 MS
EKG QTC CALCULATION (BAZETT): 452 MS
EKG R AXIS: -19 DEGREES
EKG T AXIS: 48 DEGREES
EKG VENTRICULAR RATE: 74 BPM

## (undated) DEVICE — SOL INJ L R 1000ML BG --

## (undated) DEVICE — 3 BONE CEMENT MIXER: Brand: MIXEVAC

## (undated) DEVICE — NDL SPNE QNCKE 18GX3.5IN LF --

## (undated) DEVICE — SUT VCRL + 1 36IN CT1 VIO --

## (undated) DEVICE — PIN GUIDE FIX 3.2X62 MM SCREW [GS903A0620322P] [KOMET MEDICAL]

## (undated) DEVICE — THE CANADY HYBRID PLASMA SCALPEL IS AN ELECTROSURGICAL PLASMA SCALPEL THAT USES AN 85MM BENDABLE PADDLE BLADE TIP. THE ELECTROSURGICAL PLASMA SCALPEL IS USED TO SIMULTANEOUSLY CUT AND COAGULATE BIOLOGICAL TISSUE.: Brand: CANADY HYBRID PLASMA PADDLE BLADE

## (undated) DEVICE — REM POLYHESIVE ADULT PATIENT RETURN ELECTRODE: Brand: VALLEYLAB

## (undated) DEVICE — STRYKER PERFORMANCE SERIES SAGITTAL BLADE: Brand: STRYKER PERFORMANCE SERIES

## (undated) DEVICE — SOLUTION SCRB 4OZ 10% PVP I POVIDONE IOD TOP PAINT EXIDINE

## (undated) DEVICE — SUTURE VCRL + SZ 1 L36IN ABSRB UD L36MM CT-1 1/2 CIR VCP947H

## (undated) DEVICE — DRSG MEPILES BORDER AG 4X12 -- 5/BX

## (undated) DEVICE — SUTURE STRATAFIX SYMMETRIC PDS + 1 SGL ARMED CT 18 IN LEN SXPP1A405

## (undated) DEVICE — SOLUTION IV 100ML 0.9% SOD CHL DIL INJ

## (undated) DEVICE — SUTURE VCRL + SZ 2-0 L36IN ABSRB UD L36MM CT-1 1/2 CIR VCP945H

## (undated) DEVICE — SOLUTION IV 1000ML LAC RINGERS PH 6.5 INJ USP VIAFLX PLAS

## (undated) DEVICE — BLADE SAW 1.19X20X90 MM FOR LG BNE

## (undated) DEVICE — PIN GUIDE FIX 3.2X62 MM SCREW GS903A0620322P] KOMET MEDICAL]

## (undated) DEVICE — PIN GUIDE FIX 3.2X62 MM SCREW GS9030620324P] KOMET MEDICAL]

## (undated) DEVICE — ELECTRODE PT RET AD L9FT HI MOIST COND ADH HYDRGEL CORDED

## (undated) DEVICE — SOL IRRIGATION INJ NACL 0.9% 500ML BTL

## (undated) DEVICE — ZIP 16 SURGICAL SKIN CLOSURE DEVICE: Brand: ZIP 16 SURGICAL SKIN CLOSURE DEVICE

## (undated) DEVICE — SUT VCRL + 2-0 36IN CT1 UD --

## (undated) DEVICE — SOLUTION IRRIG 500ML 0.9% SOD CHLO USP POUR PLAS BTL

## (undated) DEVICE — Device

## (undated) DEVICE — NDL SPNE QUINCKE 20GA 3.5IN LF --

## (undated) DEVICE — HANDPIECE SET WITH HIGH FLOW TIP AND SUCTION TUBE: Brand: INTERPULSE

## (undated) DEVICE — BLADE SAW W13XL90MM 1.19MM PARA